# Patient Record
Sex: FEMALE | Race: BLACK OR AFRICAN AMERICAN | NOT HISPANIC OR LATINO | Employment: UNEMPLOYED | URBAN - METROPOLITAN AREA
[De-identification: names, ages, dates, MRNs, and addresses within clinical notes are randomized per-mention and may not be internally consistent; named-entity substitution may affect disease eponyms.]

---

## 2023-03-03 LAB
ALBUMIN (MG/L) IN URINE: NORMAL
ALBUMIN/CREATININE (UG/MG) IN URINE: NORMAL
ANION GAP IN SER/PLAS: 14 MMOL/L (ref 10–20)
CALCIUM (MG/DL) IN SER/PLAS: 9.9 MG/DL (ref 8.6–10.6)
CARBON DIOXIDE, TOTAL (MMOL/L) IN SER/PLAS: 25 MMOL/L (ref 21–32)
CHLORIDE (MMOL/L) IN SER/PLAS: 105 MMOL/L (ref 98–107)
CHOLESTEROL (MG/DL) IN SER/PLAS: 186 MG/DL (ref 0–199)
CHOLESTEROL IN HDL (MG/DL) IN SER/PLAS: 40.9 MG/DL
CHOLESTEROL/HDL RATIO: 4.5
CREATININE (MG/DL) IN SER/PLAS: 0.47 MG/DL (ref 0.5–1.05)
CREATININE (MG/DL) IN URINE: NORMAL
ESTIMATED AVERAGE GLUCOSE FOR HBA1C: 151 MG/DL
GFR FEMALE: >90 ML/MIN/1.73M2
GLUCOSE (MG/DL) IN SER/PLAS: 105 MG/DL (ref 74–99)
HEMOGLOBIN A1C/HEMOGLOBIN TOTAL IN BLOOD: 6.9 %
LDL: 98 MG/DL (ref 0–99)
NON HDL CHOLESTEROL: 145 MG/DL
POTASSIUM (MMOL/L) IN SER/PLAS: 3.9 MMOL/L (ref 3.5–5.3)
SODIUM (MMOL/L) IN SER/PLAS: 140 MMOL/L (ref 136–145)
TRIGLYCERIDE (MG/DL) IN SER/PLAS: 235 MG/DL (ref 0–149)
UREA NITROGEN (MG/DL) IN SER/PLAS: 14 MG/DL (ref 6–23)
VLDL: 47 MG/DL (ref 0–40)

## 2023-03-26 DIAGNOSIS — I10 ESSENTIAL (PRIMARY) HYPERTENSION: ICD-10-CM

## 2023-03-26 DIAGNOSIS — E78.5 HYPERLIPIDEMIA, UNSPECIFIED: ICD-10-CM

## 2023-03-27 RX ORDER — PRAVASTATIN SODIUM 40 MG/1
40 TABLET ORAL NIGHTLY
COMMUNITY

## 2023-03-27 RX ORDER — LOSARTAN POTASSIUM 100 MG/1
100 TABLET ORAL DAILY
COMMUNITY
End: 2023-03-27 | Stop reason: SDUPTHER

## 2023-03-28 RX ORDER — PRAVASTATIN SODIUM 40 MG/1
TABLET ORAL
Qty: 90 TABLET | Refills: 3 | Status: SHIPPED | OUTPATIENT
Start: 2023-03-28 | End: 2024-02-22 | Stop reason: WASHOUT

## 2023-03-28 RX ORDER — LOSARTAN POTASSIUM 100 MG/1
100 TABLET ORAL DAILY
Qty: 90 TABLET | Refills: 3 | Status: SHIPPED | OUTPATIENT
Start: 2023-03-28 | End: 2024-02-22 | Stop reason: SDUPTHER

## 2023-03-28 RX ORDER — LOSARTAN POTASSIUM 100 MG/1
TABLET ORAL
Qty: 90 TABLET | Refills: 3 | OUTPATIENT
Start: 2023-03-28

## 2023-05-19 ENCOUNTER — TELEPHONE (OUTPATIENT)
Dept: PRIMARY CARE | Facility: CLINIC | Age: 63
End: 2023-05-19

## 2023-05-19 NOTE — TELEPHONE ENCOUNTER
Patient called saying that at her last appointment, she saw Dr Mckeon and Dr Mckeon upped her dosage of Trulicity but patient was only given a 30 day supply. Patient would like a 90 day supply

## 2023-05-25 DIAGNOSIS — E11.9 TYPE 2 DIABETES MELLITUS WITHOUT COMPLICATION, WITHOUT LONG-TERM CURRENT USE OF INSULIN (MULTI): Primary | ICD-10-CM

## 2023-05-25 RX ORDER — PEN NEEDLE, DIABETIC 30 GX3/16"
1 NEEDLE, DISPOSABLE MISCELLANEOUS ONCE
Qty: 30 EACH | Refills: 2 | Status: SHIPPED | OUTPATIENT
Start: 2023-05-25 | End: 2023-05-25

## 2023-05-25 RX ORDER — CONTAINER,EMPTY
1 EACH MISCELLANEOUS AS NEEDED
Qty: 1 EACH | Refills: 0 | Status: SHIPPED | OUTPATIENT
Start: 2023-05-25 | End: 2024-02-22 | Stop reason: WASHOUT

## 2023-05-25 RX ORDER — ISOPROPYL ALCOHOL 70 ML/100ML
1 SWAB TOPICAL ONCE
Qty: 50 EACH | Refills: 1 | Status: SHIPPED | OUTPATIENT
Start: 2023-05-25 | End: 2023-05-25

## 2023-07-19 DIAGNOSIS — E11.9 TYPE 2 DIABETES MELLITUS WITHOUT COMPLICATIONS (MULTI): ICD-10-CM

## 2023-07-19 RX ORDER — METFORMIN HYDROCHLORIDE 1000 MG/1
TABLET ORAL
Qty: 180 TABLET | Refills: 3 | Status: SHIPPED | OUTPATIENT
Start: 2023-07-19 | End: 2024-02-22 | Stop reason: SINTOL

## 2024-01-24 ENCOUNTER — TELEPHONE (OUTPATIENT)
Dept: NEUROLOGY | Facility: CLINIC | Age: 64
End: 2024-01-24
Payer: COMMERCIAL

## 2024-01-25 RX ORDER — GLIPIZIDE 10 MG/1
10 TABLET, FILM COATED, EXTENDED RELEASE ORAL DAILY
Qty: 90 TABLET | Refills: 3 | OUTPATIENT
Start: 2024-01-25

## 2024-02-22 ENCOUNTER — LAB (OUTPATIENT)
Dept: LAB | Facility: LAB | Age: 64
End: 2024-02-22
Payer: COMMERCIAL

## 2024-02-22 ENCOUNTER — OFFICE VISIT (OUTPATIENT)
Dept: PRIMARY CARE | Facility: CLINIC | Age: 64
End: 2024-02-22
Payer: COMMERCIAL

## 2024-02-22 VITALS
DIASTOLIC BLOOD PRESSURE: 71 MMHG | SYSTOLIC BLOOD PRESSURE: 127 MMHG | WEIGHT: 231 LBS | BODY MASS INDEX: 36.26 KG/M2 | OXYGEN SATURATION: 98 % | TEMPERATURE: 96.5 F | HEIGHT: 67 IN | HEART RATE: 68 BPM

## 2024-02-22 DIAGNOSIS — I10 ESSENTIAL (PRIMARY) HYPERTENSION: ICD-10-CM

## 2024-02-22 DIAGNOSIS — E11.9 TYPE 2 DIABETES MELLITUS WITHOUT COMPLICATION, WITHOUT LONG-TERM CURRENT USE OF INSULIN (MULTI): ICD-10-CM

## 2024-02-22 DIAGNOSIS — E66.01 OBESITY, MORBID (MULTI): ICD-10-CM

## 2024-02-22 DIAGNOSIS — E13.9 DIABETES 1.5, MANAGED AS TYPE 2 (MULTI): ICD-10-CM

## 2024-02-22 DIAGNOSIS — I10 PRIMARY HYPERTENSION: ICD-10-CM

## 2024-02-22 DIAGNOSIS — Z12.31 ENCOUNTER FOR SCREENING MAMMOGRAM FOR MALIGNANT NEOPLASM OF BREAST: ICD-10-CM

## 2024-02-22 DIAGNOSIS — Z23 NEED FOR VACCINATION: ICD-10-CM

## 2024-02-22 DIAGNOSIS — M54.40 LOW BACK PAIN WITH SCIATICA, SCIATICA LATERALITY UNSPECIFIED, UNSPECIFIED BACK PAIN LATERALITY, UNSPECIFIED CHRONICITY: ICD-10-CM

## 2024-02-22 DIAGNOSIS — Z00.00 ROUTINE GENERAL MEDICAL EXAMINATION AT A HEALTH CARE FACILITY: Primary | ICD-10-CM

## 2024-02-22 LAB
ALBUMIN SERPL BCP-MCNC: 4.3 G/DL (ref 3.4–5)
ANION GAP SERPL CALC-SCNC: 17 MMOL/L (ref 10–20)
BUN SERPL-MCNC: 14 MG/DL (ref 6–23)
CALCIUM SERPL-MCNC: 10 MG/DL (ref 8.6–10.6)
CHLORIDE SERPL-SCNC: 106 MMOL/L (ref 98–107)
CO2 SERPL-SCNC: 21 MMOL/L (ref 21–32)
CREAT SERPL-MCNC: 0.64 MG/DL (ref 0.5–1.05)
CREAT UR-MCNC: 110.9 MG/DL (ref 20–320)
EGFRCR SERPLBLD CKD-EPI 2021: >90 ML/MIN/1.73M*2
EST. AVERAGE GLUCOSE BLD GHB EST-MCNC: 169 MG/DL
GLUCOSE SERPL-MCNC: 152 MG/DL (ref 74–99)
HBA1C MFR BLD: 7.5 %
MICROALBUMIN UR-MCNC: 15.1 MG/L
MICROALBUMIN/CREAT UR: 13.6 UG/MG CREAT
PHOSPHATE SERPL-MCNC: 3.8 MG/DL (ref 2.5–4.9)
POTASSIUM SERPL-SCNC: 4.3 MMOL/L (ref 3.5–5.3)
SODIUM SERPL-SCNC: 140 MMOL/L (ref 136–145)

## 2024-02-22 PROCEDURE — 83036 HEMOGLOBIN GLYCOSYLATED A1C: CPT

## 2024-02-22 PROCEDURE — 90677 PCV20 VACCINE IM: CPT

## 2024-02-22 PROCEDURE — 80069 RENAL FUNCTION PANEL: CPT

## 2024-02-22 PROCEDURE — 4010F ACE/ARB THERAPY RXD/TAKEN: CPT

## 2024-02-22 PROCEDURE — G0439 PPPS, SUBSEQ VISIT: HCPCS

## 2024-02-22 PROCEDURE — 82570 ASSAY OF URINE CREATININE: CPT

## 2024-02-22 PROCEDURE — G0009 ADMIN PNEUMOCOCCAL VACCINE: HCPCS

## 2024-02-22 PROCEDURE — 3078F DIAST BP <80 MM HG: CPT

## 2024-02-22 PROCEDURE — 3051F HG A1C>EQUAL 7.0%<8.0%: CPT

## 2024-02-22 PROCEDURE — 3061F NEG MICROALBUMINURIA REV: CPT

## 2024-02-22 PROCEDURE — 3074F SYST BP LT 130 MM HG: CPT

## 2024-02-22 PROCEDURE — 82043 UR ALBUMIN QUANTITATIVE: CPT

## 2024-02-22 PROCEDURE — 36415 COLL VENOUS BLD VENIPUNCTURE: CPT

## 2024-02-22 RX ORDER — CYCLOBENZAPRINE HCL 5 MG
TABLET ORAL
COMMUNITY
Start: 2023-03-03 | End: 2024-02-22 | Stop reason: SDUPTHER

## 2024-02-22 RX ORDER — BLOOD-GLUCOSE METER
KIT MISCELLANEOUS
COMMUNITY
End: 2024-02-22 | Stop reason: SDUPTHER

## 2024-02-22 RX ORDER — AMMONIUM LACTATE 12 G/100G
CREAM TOPICAL
COMMUNITY

## 2024-02-22 RX ORDER — IBUPROFEN 200 MG
CAPSULE ORAL
COMMUNITY
Start: 2015-04-06 | End: 2024-02-22 | Stop reason: SDUPTHER

## 2024-02-22 RX ORDER — DICLOFENAC SODIUM 10 MG/G
GEL TOPICAL
Qty: 450 G | Refills: 3 | Status: SHIPPED | OUTPATIENT
Start: 2024-02-22

## 2024-02-22 RX ORDER — DICLOFENAC SODIUM 10 MG/G
GEL TOPICAL
COMMUNITY
Start: 2023-06-04 | End: 2024-02-22 | Stop reason: SDUPTHER

## 2024-02-22 RX ORDER — DICYCLOMINE HYDROCHLORIDE 20 MG/1
TABLET ORAL 3 TIMES DAILY PRN
COMMUNITY
Start: 2022-06-02 | End: 2024-02-22 | Stop reason: ALTCHOICE

## 2024-02-22 RX ORDER — GLIPIZIDE 10 MG/1
10 TABLET, FILM COATED, EXTENDED RELEASE ORAL DAILY
COMMUNITY
End: 2024-02-22 | Stop reason: SDUPTHER

## 2024-02-22 RX ORDER — GLIPIZIDE 10 MG/1
10 TABLET, FILM COATED, EXTENDED RELEASE ORAL DAILY
Qty: 30 TABLET | Refills: 11 | Status: SHIPPED | OUTPATIENT
Start: 2024-02-22

## 2024-02-22 RX ORDER — MICONAZOLE NITRATE 2 %
CREAM (GRAM) TOPICAL
COMMUNITY
End: 2024-02-22 | Stop reason: ALTCHOICE

## 2024-02-22 RX ORDER — KETOCONAZOLE 20 MG/G
CREAM TOPICAL
COMMUNITY

## 2024-02-22 RX ORDER — CYCLOBENZAPRINE HCL 5 MG
TABLET ORAL
Qty: 30 TABLET | Refills: 3 | Status: SHIPPED | OUTPATIENT
Start: 2024-02-22

## 2024-02-22 RX ORDER — DM/P-EPHED/ACETAMINOPH/DOXYLAM 30-7.5/3
LIQUID (ML) ORAL
COMMUNITY
Start: 2021-08-23

## 2024-02-22 RX ORDER — BLOOD-GLUCOSE METER
KIT MISCELLANEOUS
Qty: 30 STRIP | Refills: 11 | Status: SHIPPED | OUTPATIENT
Start: 2024-02-22

## 2024-02-22 RX ORDER — ACETAMINOPHEN 500 MG
TABLET ORAL
COMMUNITY
Start: 2014-04-15 | End: 2024-02-22 | Stop reason: SDUPTHER

## 2024-02-22 RX ORDER — BLOOD-GLUCOSE METER
KIT MISCELLANEOUS
Qty: 1 EACH | Refills: 0 | Status: SHIPPED | OUTPATIENT
Start: 2024-02-22 | End: 2025-02-21

## 2024-02-22 RX ORDER — LANCETS 28 GAUGE
EACH MISCELLANEOUS
Qty: 100 EACH | Refills: 11 | Status: SHIPPED | OUTPATIENT
Start: 2024-02-22

## 2024-02-22 RX ORDER — AMLODIPINE BESYLATE 10 MG/1
10 TABLET ORAL DAILY
COMMUNITY
End: 2024-02-22 | Stop reason: SDUPTHER

## 2024-02-22 RX ORDER — UBIQUINOL 100 MG
CAPSULE ORAL
COMMUNITY
Start: 2024-02-12

## 2024-02-22 RX ORDER — METHYLPREDNISOLONE 32 MG/1
1 TABLET ORAL DAILY
COMMUNITY
Start: 2013-07-17 | End: 2024-02-22 | Stop reason: ALTCHOICE

## 2024-02-22 RX ORDER — UBIDECARENONE 30 MG
1 CAPSULE ORAL DAILY
COMMUNITY
Start: 2020-01-31

## 2024-02-22 RX ORDER — ASPIRIN 81 MG/1
TABLET ORAL
COMMUNITY
Start: 2019-01-22

## 2024-02-22 RX ORDER — IBUPROFEN 800 MG/1
800 TABLET ORAL EVERY 8 HOURS PRN
Qty: 30 TABLET | Refills: 11 | Status: SHIPPED | OUTPATIENT
Start: 2024-02-22

## 2024-02-22 RX ORDER — METFORMIN HYDROCHLORIDE 500 MG/1
1000 TABLET, EXTENDED RELEASE ORAL
Qty: 60 TABLET | Refills: 11 | Status: SHIPPED | OUTPATIENT
Start: 2024-02-22 | End: 2025-02-21

## 2024-02-22 RX ORDER — GLATIRAMER ACETATE 20 MG/ML
INJECTION, SOLUTION SUBCUTANEOUS
COMMUNITY
Start: 2015-11-17 | End: 2024-03-26 | Stop reason: SDUPTHER

## 2024-02-22 RX ORDER — IBUPROFEN 200 MG
1 CAPSULE ORAL
Qty: 30 STRIP | Refills: 11 | Status: SHIPPED | OUTPATIENT
Start: 2024-02-22

## 2024-02-22 RX ORDER — IBUPROFEN 800 MG/1
1 TABLET ORAL EVERY 8 HOURS PRN
COMMUNITY
Start: 2018-03-08 | End: 2024-02-22 | Stop reason: SDUPTHER

## 2024-02-22 RX ORDER — AMLODIPINE BESYLATE 10 MG/1
10 TABLET ORAL DAILY
Qty: 30 TABLET | Refills: 11 | Status: SHIPPED | OUTPATIENT
Start: 2024-02-22

## 2024-02-22 RX ORDER — ALBUTEROL SULFATE 90 UG/1
AEROSOL, METERED RESPIRATORY (INHALATION)
COMMUNITY
End: 2024-02-22 | Stop reason: SDUPTHER

## 2024-02-22 RX ORDER — ACETAMINOPHEN 500 MG
2000 TABLET ORAL DAILY
Qty: 30 CAPSULE | Refills: 11 | Status: SHIPPED | OUTPATIENT
Start: 2024-02-22

## 2024-02-22 RX ORDER — LANCETS 28 GAUGE
EACH MISCELLANEOUS
COMMUNITY
Start: 2024-02-12 | End: 2024-02-22 | Stop reason: SDUPTHER

## 2024-02-22 RX ORDER — ALBUTEROL SULFATE 90 UG/1
AEROSOL, METERED RESPIRATORY (INHALATION)
Qty: 18 G | Refills: 11 | Status: SHIPPED | OUTPATIENT
Start: 2024-02-22

## 2024-02-22 RX ORDER — DIAZEPAM 5 MG/1
TABLET ORAL
COMMUNITY
Start: 2022-08-26

## 2024-02-22 RX ORDER — LOSARTAN POTASSIUM 100 MG/1
100 TABLET ORAL DAILY
Qty: 90 TABLET | Refills: 11 | Status: SHIPPED | OUTPATIENT
Start: 2024-02-22 | End: 2027-02-06

## 2024-02-22 RX ORDER — CETIRIZINE HYDROCHLORIDE 10 MG/1
1 TABLET ORAL NIGHTLY
COMMUNITY
Start: 2021-05-04 | End: 2024-02-22 | Stop reason: ALTCHOICE

## 2024-02-22 ASSESSMENT — PAIN SCALES - GENERAL: PAINLEVEL: 0-NO PAIN

## 2024-02-22 NOTE — PATIENT INSTRUCTIONS
Thank you for coming in to see us today, Ms. Apryl Chan! It was a pleasure managing your health together.    Today in clinic, we discussed your diabetes and your foot pain.    For your foot pain I recommend insoles or HOKA shoes.     We changed your metformin to an extended release so that you do not have as much GI side effects.     If we ordered bloodwork today, you can get this done at ANY  location and the results will come to me directly. The [facility] here at [facility] are walk-in (no appointment needed); [facility]'s hours are M-F 6:30a-6p and Sat 8a-12p.    If you have any questions/concerns, you can always use [employer] to message me directly. Or if you prefer, you can call the office at [phone]; if you leave a message, the office staff should translate this to a message in my inbox.    I'm looking forward to seeing you back in clinic in 3 months to discuss your diabetes.    Best,  Dr. Desir

## 2024-02-22 NOTE — PROGRESS NOTES
Subjective   Reason for Visit: Apryl Chan is an 63 y.o. female here for a Medicare Wellness visit.     # DMII  - states that BG is 150-170   - doing well with current meds: dulaglutide 3 mg daily, glipizide 10 mg/daily, and metformin 100 mg daily  - endorses GI side-effects w/ metformin.     # increased BMI   - BMI of 36.18    Reviewed all medications by prescribing practitioner or clinical pharmacist (such as prescriptions, OTCs, herbal therapies and supplements) and documented in the medical record.    # Medicare Wellness Questionnaire  - Age: 63 y.o.  - Gender: female   - During the past 4 weeks, how much of the been bothered by emotional problems such as feeling anxious, depressed, irritable, sad, or downhearted and blue? Slightly  - During the past 4 weeks, history of physical and emotional health limited your social activities with family, friends, neighbors, or groups? Not at all  - During the past 4 weeks, how much bodily pain have you generally had? Mild pain  - During the past 4 weeks, was someone available to help you if you needed and wanted help?  (For example, if you felt very nervous, lonely, or blue; got sick and had to stay in bed; needed someone to talk to; needed help with daily chores; or needed help just taking care of yourself.) Yes, as much as I wanted  - During the past 4 weeks, what was the hardest physical activity you could do for at least 2 minutes? Moderate  - ADLS/IADLs:  --- Can you get to places out of walking distance without help?  (For example, can you travel alone on buses or taxis, or drive your own car?) Yes  --- Can you go shopping for groceries or clothes without someone's help? Yes  --- Can you prepare your own meals? Yes  --- Can you do your housework without help? Yes  --- Because of any health problems, do you need the help of another person with your personal care needs such as eating, bathing, dressing, or getting around the house? Yes  --- Can you handle your own  "money without help? Yes  - During the past 4 weeks, how would you rate your health in general?Fair  - How have things been going for you during the past 4 weeks? Very well; could hardly be better  - Are you having difficulties driving your car?no  - Do you always fasten your seatbelt when you are in a car? Yes, usually  - How often during the past 4 weeks have you been bothered by any of the following problems?  --- Falling or dizzy when standing up. Never  --- Sexual problems. Never  --- Trouble eating well. Never  --- Teeth or denture problems. Sometimes  --- Problems using the telephone. Never  --- Tiredness or fatigue. Always  - Have you fallen 2 or more times in the past year? No  - Are you afraid of falling? No  - Are you a smoker? Yes, and I might quit  - During the past 4 weeks, how many drinks of wine, beer, or other alcoholic beverages did you have? 1 drink or less per week  - Do you exercise for about 20 minutes, 3 or more days a week? Yes, some of the time  - Have you been given any information to help you with the following:  --- Hazards in your house that might hurt you? No  --- Keeping track of your medications? No  - How often do you have trouble taking medicines the way you have been told to take them? I always take them as prescribed  - How confident are you that you can control and manage most of your health problems? Very confident  - What is your race?  (Check all that apply) Black or -American    Patient Care Team:  Glenda Cullen MD as PCP - [facility] PCP     Review of Systems    Objective   Vitals:  /71 (BP Location: Left arm, Patient Position: Sitting)   Pulse 68   Temp 35.8 °C (96.5 °F) (Temporal)   Ht 1.702 m (5' 7\")   Wt 105 kg (231 lb)   SpO2 98%   BMI 36.18 kg/m²       Physical Exam    Assessment/Plan   Problem List Items Addressed This Visit    None    # DMII   :: pt has side effects with metformin   - Switch metformin 1000 mg to XR.  - c/w trulicity 3 mg/ " week and glipizide 10 mg     # HTN  :: amlodipine 10 mg and losartan 100 mg   - urine albumin and RFP obtained    # HM   - Prevnar 20 administered   - colonoscopy referral and mammogram referral provided.     Attending Supervision: discussed with attending physician eB Holley in 3 months, or earlier as needed.    Reviewed and approved by VEE GARCÍA on 2/22/24 at 10:28 AM.

## 2024-02-24 PROBLEM — Z23 NEED FOR VACCINATION: Status: ACTIVE | Noted: 2024-02-24

## 2024-02-24 PROBLEM — E66.01 OBESITY, MORBID (MULTI): Status: ACTIVE | Noted: 2024-02-24

## 2024-02-25 NOTE — PROGRESS NOTES
I saw and evaluated the patient. I personally obtained the key and critical portions of the history and physical exam or was physically present for key and critical portions performed by the resident/fellow. I reviewed the resident/fellow's documentation and discussed the patient with the resident/fellow. I agree with the resident/fellow's medical decision making as documented in the note.    Fredo June MD

## 2024-02-27 DIAGNOSIS — I10 HYPERTENSION, UNSPECIFIED TYPE: Primary | ICD-10-CM

## 2024-03-05 ENCOUNTER — LAB (OUTPATIENT)
Dept: LAB | Facility: LAB | Age: 64
End: 2024-03-05
Payer: COMMERCIAL

## 2024-03-05 DIAGNOSIS — I10 HYPERTENSION, UNSPECIFIED TYPE: ICD-10-CM

## 2024-03-05 LAB — CRP SERPL-MCNC: 0.44 MG/DL

## 2024-03-05 PROCEDURE — 86140 C-REACTIVE PROTEIN: CPT

## 2024-03-05 PROCEDURE — 36415 COLL VENOUS BLD VENIPUNCTURE: CPT

## 2024-03-21 DIAGNOSIS — E13.9 DIABETES 1.5, MANAGED AS TYPE 2 (MULTI): Primary | ICD-10-CM

## 2024-03-25 RX ORDER — DULAGLUTIDE 0.75 MG/.5ML
0.75 INJECTION, SOLUTION SUBCUTANEOUS
Qty: 2 ML | Refills: 11 | Status: SHIPPED | OUTPATIENT
Start: 2024-03-25

## 2024-03-26 DIAGNOSIS — G35 MULTIPLE SCLEROSIS (MULTI): Primary | ICD-10-CM

## 2024-03-27 RX ORDER — GLATIRAMER ACETATE 20 MG/ML
20 INJECTION, SOLUTION SUBCUTANEOUS DAILY
Qty: 30 ML | Refills: 0 | Status: SHIPPED | OUTPATIENT
Start: 2024-03-27 | End: 2024-04-26

## 2024-04-27 ENCOUNTER — HOSPITAL ENCOUNTER (EMERGENCY)
Facility: HOSPITAL | Age: 64
Discharge: HOME | End: 2024-04-27
Payer: COMMERCIAL

## 2024-04-27 VITALS
HEART RATE: 75 BPM | HEIGHT: 67 IN | TEMPERATURE: 95.9 F | RESPIRATION RATE: 18 BRPM | SYSTOLIC BLOOD PRESSURE: 183 MMHG | OXYGEN SATURATION: 97 % | DIASTOLIC BLOOD PRESSURE: 84 MMHG | BODY MASS INDEX: 36.1 KG/M2 | WEIGHT: 230 LBS

## 2024-04-27 DIAGNOSIS — H00.011 HORDEOLUM EXTERNUM OF RIGHT UPPER EYELID: Primary | ICD-10-CM

## 2024-04-27 PROCEDURE — 99283 EMERGENCY DEPT VISIT LOW MDM: CPT | Performed by: NURSE PRACTITIONER

## 2024-04-27 PROCEDURE — 99283 EMERGENCY DEPT VISIT LOW MDM: CPT

## 2024-04-27 RX ORDER — ERYTHROMYCIN 5 MG/G
OINTMENT OPHTHALMIC EVERY 6 HOURS
Qty: 1 G | Refills: 0 | Status: SHIPPED | OUTPATIENT
Start: 2024-04-27 | End: 2024-05-02

## 2024-04-27 ASSESSMENT — PAIN - FUNCTIONAL ASSESSMENT: PAIN_FUNCTIONAL_ASSESSMENT: 0-10

## 2024-04-27 ASSESSMENT — COLUMBIA-SUICIDE SEVERITY RATING SCALE - C-SSRS
2. HAVE YOU ACTUALLY HAD ANY THOUGHTS OF KILLING YOURSELF?: NO
1. IN THE PAST MONTH, HAVE YOU WISHED YOU WERE DEAD OR WISHED YOU COULD GO TO SLEEP AND NOT WAKE UP?: NO
6. HAVE YOU EVER DONE ANYTHING, STARTED TO DO ANYTHING, OR PREPARED TO DO ANYTHING TO END YOUR LIFE?: NO

## 2024-04-27 ASSESSMENT — PAIN SCALES - GENERAL: PAINLEVEL_OUTOF10: 10 - WORST POSSIBLE PAIN

## 2024-04-27 ASSESSMENT — PAIN DESCRIPTION - LOCATION: LOCATION: EYE

## 2024-04-27 ASSESSMENT — PAIN DESCRIPTION - ORIENTATION: ORIENTATION: RIGHT

## 2024-04-27 NOTE — ED PROVIDER NOTES
HPI   Chief Complaint   Patient presents with   • Eye Problem       A very pleasant 63-year-old female with a history significant for diabetes, hypertension, dyslipidemia, and multiple sclerosis presents ambulatory to the emergency department with a 3-day history of tenderness, swelling, and discharge to her left medial upper eyelid.  Denies any associated fever, chills, headache, visual changes,rhinorrhea, sinus pain or pressure, vertigo, ear pain, focal numbness/tingling/weakness, eye trauma, foreign body sensation, limited range of motion of the eye, photophobia,pruritus, sore throat, chest pain, shortness of breath, abdominal pain, nausea, vomiting, diarrhea.  States that her blood sugar was 160 yesterday.    Review of systems: A review of systems was performed with pertinent positives and negatives as per HPI    Family history: Noncontributory    Social history: Smokes 2 cigarettes daily.  Drinks alcohol occasionally.  Denies any drug use.    Medical history: Diabetes, hypertension, dyslipidemia, multiple sclerosis    Surgical history: Denies any surgical procedures    Medications: Trulicity, metformin, amlodipine, losartan, glaucoma, vitamin D, pravastatin    Allergies: Lisinopril, Ultram    Physical exam:  Gen.: Awake, alert, oriented. No acute distress afebrile. Normal phonation, no stridor, no trismus.   Optho: RADHA EOMI No scleral icterus. Exam of the left eye is unremarkable. Exam of the right eye without conjunctival injection. There are no obvious foreign bodies. Upper lid with positive medial swelling/erythema/tenderness with small amount of crusted yellow discharge. There is no direct or consensual photophobia. No afferent pupillary defect.   ENT: EACs unremarkable. Mastoids nontender. Posterior oropharynx without erythema, exudate, or swelling. Uvula is in the midline and nonedematous.   Neck: Supple. No meningismus through full range of motion. No lymphadenopathy.   Cardiac: Regular rate rhythm no  murmur. No reproducible tenderness.   Respiratory: No increases work of breathing. Lung sounds CTA.   Abdomen: Soft nontender with normoactive bowel sounds.                               [place] Coma Scale Score: 15                     Patient History   Past Medical History:   Diagnosis Date   • Age-related nuclear cataract, left eye 08/27/2015    Age-related nuclear cataract of left eye   • Age-related nuclear cataract, right eye 01/31/2017    Age-related nuclear cataract of right eye   • Chronic obstructive pulmonary disease, unspecified (Multi) 04/13/2015    COPD (chronic obstructive pulmonary disease)   • Hypertension    • Multiple sclerosis (Multi) 07/15/2015    History of multiple sclerosis   • Multiple sclerosis (Multi) 11/02/2022    Multiple sclerosis   • Other conditions influencing health status 06/04/2021    Background diabetic retinopathy of left eye without macular edema determined by association associated with type 2 diabetes mellitus   • Personal history of other diseases of the circulatory system 07/15/2015    History of hypertension   • Personal history of other diseases of the respiratory system     History of chronic obstructive lung disease   • Personal history of other endocrine, nutritional and metabolic disease     History of insulin dependent diabetes mellitus   • Type 2 diabetes mellitus without complications (Multi) 02/23/2022    Diabetes mellitus type 2, controlled   • Unspecified blepharitis left eye, unspecified eyelid 01/31/2017    Blepharitis of left eye     History reviewed. No pertinent surgical history.  No family history on file.  Social History     Tobacco Use   • Smoking status: Every Day     Types: Cigarettes   • Smokeless tobacco: Current   Substance Use Topics   • Alcohol use: Not Currently   • Drug use: Never       Physical Exam   ED Triage Vitals [04/27/24 1132]   Temperature Heart Rate Respirations BP   35.5 °C (95.9 °F) 75 18 (!) 183/84      Pulse Ox Temp src Heart Rate  Source Patient Position   97 % -- -- --      BP Location FiO2 (%)     -- --       Physical Exam    ED Course & MDM   Diagnoses as of 04/27/24 1348   Hordeolum externum of right upper eyelid       Medical Decision Making  A physical examination and interview was conducted with the patient who presents ambulatory to the emergency department for right eyelid redness, swelling, and discharge.  Patient is sitting up comfortably in a chair.  In no acute distress.  She has a hordeolum of her right upper eyelid.  No visual changes.  I discussed plan of care and differential with the patient.  I will prescribe her erythromycin eye ointment and refer her to follow-up with ophthalmology this week.  I reviewed general care for a stye including applying warm compresses, gentle massage, and to not squeeze or try to pop the area.  Advised to return to the emergency department as needed for worsening symptoms or other concerns.        Procedure  Procedures     ANUJ Hewitt-CHRISTIANO  04/27/24 5539

## 2024-05-03 DIAGNOSIS — I10 HYPERTENSION, UNSPECIFIED TYPE: Primary | ICD-10-CM

## 2024-05-06 ENCOUNTER — LAB (OUTPATIENT)
Dept: LAB | Facility: LAB | Age: 64
End: 2024-05-06
Payer: COMMERCIAL

## 2024-05-06 DIAGNOSIS — I10 HYPERTENSION, UNSPECIFIED TYPE: ICD-10-CM

## 2024-05-06 PROCEDURE — 86140 C-REACTIVE PROTEIN: CPT

## 2024-05-07 LAB — CRP SERPL-MCNC: 1.17 MG/DL

## 2024-05-09 ENCOUNTER — APPOINTMENT (OUTPATIENT)
Dept: PRIMARY CARE | Facility: CLINIC | Age: 64
End: 2024-05-09
Payer: COMMERCIAL

## 2024-05-14 ENCOUNTER — APPOINTMENT (OUTPATIENT)
Dept: PRIMARY CARE | Facility: CLINIC | Age: 64
End: 2024-05-14
Payer: COMMERCIAL

## 2024-06-11 DIAGNOSIS — Z12.11 COLON CANCER SCREENING: Primary | ICD-10-CM

## 2024-06-11 RX ORDER — POLYETHYLENE GLYCOL 3350, SODIUM CHLORIDE, SODIUM BICARBONATE, POTASSIUM CHLORIDE 420; 11.2; 5.72; 1.48 G/4L; G/4L; G/4L; G/4L
4000 POWDER, FOR SOLUTION ORAL ONCE
Qty: 4000 ML | Refills: 0 | Status: SHIPPED | OUTPATIENT
Start: 2024-06-11 | End: 2024-06-11

## 2024-06-11 NOTE — PROGRESS NOTES
Bowel preparation has been prescribed for patient's upcoming colonoscopy procedure.    Martin Canela MD  Gastroenterology

## 2024-06-21 ENCOUNTER — APPOINTMENT (OUTPATIENT)
Dept: GASTROENTEROLOGY | Facility: HOSPITAL | Age: 64
End: 2024-06-21
Payer: COMMERCIAL

## 2024-07-11 ENCOUNTER — SPECIALTY PHARMACY (OUTPATIENT)
Dept: PHARMACY | Facility: CLINIC | Age: 64
End: 2024-07-11

## 2024-07-11 DIAGNOSIS — G35 MULTIPLE SCLEROSIS (MULTI): ICD-10-CM

## 2024-07-11 RX ORDER — GLATIRAMER ACETATE 20 MG/ML
INJECTION, SOLUTION SUBCUTANEOUS
Qty: 30 ML | Refills: 0 | Status: SHIPPED | OUTPATIENT
Start: 2024-07-11

## 2024-07-19 ENCOUNTER — APPOINTMENT (OUTPATIENT)
Dept: PRIMARY CARE | Facility: CLINIC | Age: 64
End: 2024-07-19
Payer: COMMERCIAL

## 2024-07-19 VITALS
HEART RATE: 71 BPM | TEMPERATURE: 96.7 F | RESPIRATION RATE: 18 BRPM | BODY MASS INDEX: 36.22 KG/M2 | SYSTOLIC BLOOD PRESSURE: 129 MMHG | HEIGHT: 67 IN | WEIGHT: 230.8 LBS | OXYGEN SATURATION: 98 % | DIASTOLIC BLOOD PRESSURE: 85 MMHG

## 2024-07-19 DIAGNOSIS — Z71.6 ENCOUNTER FOR TOBACCO USE CESSATION COUNSELING: ICD-10-CM

## 2024-07-19 DIAGNOSIS — E13.9 DIABETES 1.5, MANAGED AS TYPE 2 (MULTI): Primary | ICD-10-CM

## 2024-07-19 DIAGNOSIS — E11.9 TYPE 2 DIABETES MELLITUS WITHOUT COMPLICATION, WITHOUT LONG-TERM CURRENT USE OF INSULIN (MULTI): ICD-10-CM

## 2024-07-19 DIAGNOSIS — E78.2 MIXED HYPERLIPIDEMIA: ICD-10-CM

## 2024-07-19 RX ORDER — METFORMIN HYDROCHLORIDE 500 MG/1
1000 TABLET ORAL
Qty: 400 TABLET | Refills: 3 | Status: SHIPPED | OUTPATIENT
Start: 2024-07-19 | End: 2025-08-23

## 2024-07-19 RX ORDER — NICOTINE 7MG/24HR
1 PATCH, TRANSDERMAL 24 HOURS TRANSDERMAL EVERY 24 HOURS
Qty: 14 PATCH | Refills: 2 | Status: SHIPPED | OUTPATIENT
Start: 2024-07-19 | End: 2024-08-30

## 2024-07-19 RX ORDER — PRAVASTATIN SODIUM 40 MG/1
40 TABLET ORAL NIGHTLY
Qty: 90 TABLET | Refills: 3 | Status: SHIPPED | OUTPATIENT
Start: 2024-07-19

## 2024-07-19 ASSESSMENT — PATIENT HEALTH QUESTIONNAIRE - PHQ9
2. FEELING DOWN, DEPRESSED OR HOPELESS: NOT AT ALL
1. LITTLE INTEREST OR PLEASURE IN DOING THINGS: NOT AT ALL
SUM OF ALL RESPONSES TO PHQ9 QUESTIONS 1 AND 2: 0

## 2024-07-19 ASSESSMENT — ENCOUNTER SYMPTOMS
DEPRESSION: 0
LOSS OF SENSATION IN FEET: 0
OCCASIONAL FEELINGS OF UNSTEADINESS: 0

## 2024-07-19 ASSESSMENT — COLUMBIA-SUICIDE SEVERITY RATING SCALE - C-SSRS
6. HAVE YOU EVER DONE ANYTHING, STARTED TO DO ANYTHING, OR PREPARED TO DO ANYTHING TO END YOUR LIFE?: NO
1. IN THE PAST MONTH, HAVE YOU WISHED YOU WERE DEAD OR WISHED YOU COULD GO TO SLEEP AND NOT WAKE UP?: NO
2. HAVE YOU ACTUALLY HAD ANY THOUGHTS OF KILLING YOURSELF?: NO

## 2024-07-19 ASSESSMENT — PAIN SCALES - GENERAL: PAINLEVEL: 0-NO PAIN

## 2024-07-19 NOTE — PROGRESS NOTES
"  Subjective   Patient ID: Apryl Chan is a 64 y.o. female who presents for primary care medicare annual (Follow up) and Med Refill.       1. DM2, non-insulin dependent  Last trulicyt injection was Sunday  Will resume the 3.0 weekly this cming Sunday  Metformin XR 4-5 days of left flakn pain  Switched back to regular Metf    A/  A1c 7.5% 2/2024    P/  [ ] patient going to  proper dose of trulicity (3 mg); has been using 0.75 in the interrim due to lack of stock at pharmacy  [ ] time-stamped A1c for 1 month, following consitent admin of correct GLP-1 RA, as above    2. HLD  Overall, has some adherence issues with all medications  Working on pill box and timers in her phone  No side effects to statin therapy    A/P/  BMI 36.15  SCVD risk sore 34.1%  [ ] renewed statin  [ ] counseling based around possible claudication    3. Nicotine cessation counseling  Patient motivated to quit  Extra motivation if it is affecting her leg pain  Daily consumption of max 3 cigarettes, usually 1    A/P/  [ ] sent nicotine patches, 7 mg    Med Refill             Review of Systems   All other systems reviewed and are negative.      Objective   /85 (BP Location: Right arm, Patient Position: Sitting, BP Cuff Size: Adult)   Pulse 71   Temp 35.9 °C (96.7 °F) (Temporal)   Resp 18   Ht 1.702 m (5' 7\")   Wt 105 kg (230 lb 12.8 oz)   SpO2 98%   BMI 36.15 kg/m²     Physical Exam  Vitals reviewed.   Constitutional:       Appearance: Normal appearance.      Comments: BMI 36, class II obesity   HENT:      Head: Normocephalic and atraumatic.      Mouth/Throat:      Mouth: Mucous membranes are moist.      Pharynx: Oropharynx is clear.   Eyes:      Extraocular Movements: Extraocular movements intact.      Conjunctiva/sclera: Conjunctivae normal.      Pupils: Pupils are equal, round, and reactive to light.   Cardiovascular:      Rate and Rhythm: Normal rate and regular rhythm.      Pulses: Normal pulses.      Heart sounds: " Normal heart sounds.   Pulmonary:      Effort: Pulmonary effort is normal.      Breath sounds: Normal breath sounds.   Abdominal:      General: Abdomen is flat. Bowel sounds are normal.      Palpations: Abdomen is soft.   Musculoskeletal:         General: Normal range of motion.      Cervical back: Normal range of motion and neck supple.   Skin:     General: Skin is warm and dry.      Capillary Refill: Capillary refill takes less than 2 seconds.   Neurological:      General: No focal deficit present.      Mental Status: She is alert.   Psychiatric:         Mood and Affect: Mood normal.         Behavior: Behavior normal.         Assessment/Plan   Problem List Items Addressed This Visit    None  Visit Diagnoses         Codes    Diabetes 1.5, managed as type 2 (Multi)    -  Primary E13.9    Type 2 diabetes mellitus without complication, without long-term current use of insulin (Multi)     E11.9    Relevant Medications    metFORMIN (Glucophage) 500 mg tablet    Other Relevant Orders    Hemoglobin A1c    Encounter for tobacco use cessation counseling     Z71.6    Relevant Medications    nicotine (Nicoderm CQ) 7 mg/24 hr patch    Mixed hyperlipidemia     E78.2    Relevant Medications    pravastatin (Pravachol) 40 mg tablet                       OVERALL PLAN:  [ ] time stamped A1c  [ ] sent nicotine patches, 7 mg  [ ] counseling based around possible claudication        Note: This documentaion was entered into the electronic medical record using [employer] medical dictation software, and was not necessarily edited thereafter. Please excuse any errors of spelling or grammar.

## 2024-07-22 PROBLEM — J44.9 COPD (CHRONIC OBSTRUCTIVE PULMONARY DISEASE) (MULTI): Status: ACTIVE | Noted: 2024-07-22

## 2024-08-01 ENCOUNTER — LAB (OUTPATIENT)
Dept: LAB | Facility: LAB | Age: 64
End: 2024-08-01
Payer: COMMERCIAL

## 2024-08-01 DIAGNOSIS — I10 HYPERTENSION, UNSPECIFIED TYPE: Primary | ICD-10-CM

## 2024-08-01 DIAGNOSIS — I10 HYPERTENSION, UNSPECIFIED TYPE: ICD-10-CM

## 2024-08-01 LAB — CRP SERPL-MCNC: 0.5 MG/DL

## 2024-08-01 PROCEDURE — 86140 C-REACTIVE PROTEIN: CPT

## 2024-08-16 DIAGNOSIS — G35 MULTIPLE SCLEROSIS (MULTI): ICD-10-CM

## 2024-08-16 RX ORDER — GLATIRAMER ACETATE 20 MG/ML
INJECTION, SOLUTION SUBCUTANEOUS
Qty: 30 ML | Refills: 0 | Status: SHIPPED | OUTPATIENT
Start: 2024-08-16

## 2024-08-19 ENCOUNTER — LAB (OUTPATIENT)
Dept: LAB | Facility: LAB | Age: 64
End: 2024-08-19
Payer: COMMERCIAL

## 2024-08-19 DIAGNOSIS — I10 HYPERTENSION, UNSPECIFIED TYPE: ICD-10-CM

## 2024-08-19 DIAGNOSIS — E11.9 TYPE 2 DIABETES MELLITUS WITHOUT COMPLICATION, WITHOUT LONG-TERM CURRENT USE OF INSULIN (MULTI): ICD-10-CM

## 2024-08-19 DIAGNOSIS — I10 HYPERTENSION, UNSPECIFIED TYPE: Primary | ICD-10-CM

## 2024-08-19 LAB
CHOLEST SERPL-MCNC: 219 MG/DL (ref 0–199)
CHOLESTEROL/HDL RATIO: 5
EST. AVERAGE GLUCOSE BLD GHB EST-MCNC: 197 MG/DL
HBA1C MFR BLD: 8.5 %
HDLC SERPL-MCNC: 43.9 MG/DL
NON-HDL CHOLESTEROL: 175 MG/DL (ref 0–149)

## 2024-08-21 ENCOUNTER — APPOINTMENT (OUTPATIENT)
Dept: OPHTHALMOLOGY | Facility: CLINIC | Age: 64
End: 2024-08-21
Payer: COMMERCIAL

## 2024-09-18 ENCOUNTER — HOSPITAL ENCOUNTER (EMERGENCY)
Facility: HOSPITAL | Age: 64
Discharge: HOME | End: 2024-09-18
Payer: COMMERCIAL

## 2024-09-18 VITALS
DIASTOLIC BLOOD PRESSURE: 81 MMHG | TEMPERATURE: 96.8 F | SYSTOLIC BLOOD PRESSURE: 186 MMHG | BODY MASS INDEX: 36.1 KG/M2 | WEIGHT: 230 LBS | RESPIRATION RATE: 18 BRPM | OXYGEN SATURATION: 100 % | HEART RATE: 67 BPM | HEIGHT: 67 IN

## 2024-09-18 DIAGNOSIS — M54.40 LOW BACK PAIN WITH SCIATICA, SCIATICA LATERALITY UNSPECIFIED, UNSPECIFIED BACK PAIN LATERALITY, UNSPECIFIED CHRONICITY: ICD-10-CM

## 2024-09-18 DIAGNOSIS — M79.18 MUSCULOSKELETAL PAIN: Primary | ICD-10-CM

## 2024-09-18 PROCEDURE — 2500000004 HC RX 250 GENERAL PHARMACY W/ HCPCS (ALT 636 FOR OP/ED): Performed by: NURSE PRACTITIONER

## 2024-09-18 PROCEDURE — 99284 EMERGENCY DEPT VISIT MOD MDM: CPT | Performed by: NURSE PRACTITIONER

## 2024-09-18 PROCEDURE — 96372 THER/PROPH/DIAG INJ SC/IM: CPT | Performed by: NURSE PRACTITIONER

## 2024-09-18 PROCEDURE — 99283 EMERGENCY DEPT VISIT LOW MDM: CPT

## 2024-09-18 RX ORDER — ORPHENADRINE CITRATE 30 MG/ML
60 INJECTION INTRAMUSCULAR; INTRAVENOUS ONCE
Status: COMPLETED | OUTPATIENT
Start: 2024-09-18 | End: 2024-09-18

## 2024-09-18 RX ORDER — NAPROXEN 500 MG/1
500 TABLET ORAL 2 TIMES DAILY PRN
Qty: 30 TABLET | Refills: 0 | Status: SHIPPED | OUTPATIENT
Start: 2024-09-18 | End: 2024-10-03

## 2024-09-18 RX ORDER — CYCLOBENZAPRINE HCL 5 MG
TABLET ORAL
Qty: 30 TABLET | Refills: 0 | Status: SHIPPED | OUTPATIENT
Start: 2024-09-18

## 2024-09-18 RX ORDER — KETOROLAC TROMETHAMINE 30 MG/ML
30 INJECTION, SOLUTION INTRAMUSCULAR; INTRAVENOUS ONCE
Status: COMPLETED | OUTPATIENT
Start: 2024-09-18 | End: 2024-09-18

## 2024-09-18 ASSESSMENT — COLUMBIA-SUICIDE SEVERITY RATING SCALE - C-SSRS
2. HAVE YOU ACTUALLY HAD ANY THOUGHTS OF KILLING YOURSELF?: NO
6. HAVE YOU EVER DONE ANYTHING, STARTED TO DO ANYTHING, OR PREPARED TO DO ANYTHING TO END YOUR LIFE?: NO
1. IN THE PAST MONTH, HAVE YOU WISHED YOU WERE DEAD OR WISHED YOU COULD GO TO SLEEP AND NOT WAKE UP?: NO

## 2024-09-18 ASSESSMENT — PAIN - FUNCTIONAL ASSESSMENT: PAIN_FUNCTIONAL_ASSESSMENT: 0-10

## 2024-09-18 ASSESSMENT — PAIN SCALES - GENERAL: PAINLEVEL_OUTOF10: 9

## 2024-09-18 NOTE — ED PROVIDER NOTES
Emergency Department Encounter  [facility] EMERGENCY MEDICINE    Patient: Apryl Chan  MRN: [id number]  : 1960  Date of Evaluation: 2024  ED Provider: MARCI Sutton      Chief Complaint       Chief Complaint   Patient presents with    Arm Injury    Leg Injury     [place]    (Location/Symptom, Timing/Onset, Context/Setting, Quality, Duration, Modifying Factors, Severity) Note limiting factors.   Limitations to History: none  Historian: self  Records reviewed: EMR inpatient and outpatient notes, Care Everywhere      Apryl Chan is a 64 y.o. female with past medical history of MS, hypertension, diabetes who presents to the emergency department complaining of left-sided pain.  Around 415 this evening she was exiting her apartment building in the elevator close in her left side.  She admits to losing her balance but did not fall.  She describes the pain starting at the left side of her lateral neck going towards her left lateral hip.  She rates the pain 8 out of 10 describing it as throbbing/stabbing/burning that comes and goes.  She admits to neck pain when turning her head to the left side.  Denies nausea, vomiting, shortness of breath, fever, chills, recent illnesses.  Patient is currently taking losartan, metformin, amlodipine, pravastatin, glatpoa.  Allergies to lisinopril and tramadol.  Patient states she drinks 3 beers couple times a week.  She also smokes marijuana and a few cigarettes a day.  Denies any other illicit drug use    ROS:     Review of Systems  14 systems reviewed and otherwise acutely negative except as in the [place].          Past History     Past Medical History:   Diagnosis Date    Age-related nuclear cataract, left eye 2015    Age-related nuclear cataract of left eye    Age-related nuclear cataract, right eye 2017    Age-related nuclear cataract of right eye    Chronic obstructive pulmonary disease, unspecified (Multi) 2015    COPD  (chronic obstructive pulmonary disease)    Hypertension     Multiple sclerosis (Multi) 07/15/2015    History of multiple sclerosis    Multiple sclerosis (Multi) 11/02/2022    Multiple sclerosis    Other conditions influencing health status 06/04/2021    Background diabetic retinopathy of left eye without macular edema determined by association associated with type 2 diabetes mellitus    Personal history of other diseases of the circulatory system 07/15/2015    History of hypertension    Personal history of other diseases of the respiratory system     History of chronic obstructive lung disease    Personal history of other endocrine, nutritional and metabolic disease     History of insulin dependent diabetes mellitus    Type 2 diabetes mellitus without complications (Multi) 02/23/2022    Diabetes mellitus type 2, controlled    Unspecified blepharitis left eye, unspecified eyelid 01/31/2017    Blepharitis of left eye     No past surgical history on file.  Social History     Socioeconomic History    Marital status: Single   Tobacco Use    Smoking status: Every Day     Types: Cigarettes    Smokeless tobacco: Current   Substance and Sexual Activity    Alcohol use: Not Currently    Drug use: Never     Social Determinants of Health     Financial Resource Strain: Not on File (10/19/2023)    Received from [facility]    Financial Resource Strain     Financial Resource Strain: 0   Transportation Needs: Not on File (10/19/2023)    Received from [facility]    Transportation Needs     Transportation: 0   Physical Activity: Not on File (10/19/2023)    Received from [facility]    Physical Activity     Physical Activity: 0   Stress: Not on File (10/19/2023)    Received from [facility]    Stress     Stress: 0   Social Connections: Not on File (10/19/2023)    Received from [facility]    Social Connections     Social Connections and Isolation: 0   Housing Stability: Not on File (10/19/2023)    Received from [facility]     Housing Stability     [place]       Medications/Allergies     Previous Medications    ALBUTEROL 90 MCG/ACTUATION INHALER    INHALE 1 TO 2 PUFFS BY MOUTH EVERY 4 TO 6 HOURS AS NEEDED AS DIRECTED    ALCOHOL PREP PADS PADS, MEDICATED    APPLY 1 SWAB TOPICALLY 1 TIME FOR 1 DOSE.    AMLODIPINE (NORVASC) 10 MG TABLET    Take 1 tablet (10 mg) by mouth once daily.    AMMONIUM LACTATE (AMLACTIN) 12 % CREAM    APPLY 1 APPLICATION TOPICALLY EVERY DAY    ASPIRIN 81 MG EC TABLET    Take by mouth.    BLOOD SUGAR DIAGNOSTIC (BLOOD GLUCOSE TEST) STRIP    Inject 1 strip under the skin once daily.    CHOLECALCIFEROL (VITAMIN D-3) 50 MCG (2,000 UNIT) CAPSULE    Take 1 capsule (50 mcg) by mouth early in the morning..    DIAZEPAM (VALIUM) 5 MG TABLET    Take by mouth.    DICLOFENAC SODIUM (VOLTAREN) 1 % GEL    APPLY SPARINGLY TO AFFECTED AREA EVERY DAY    DULAGLUTIDE (TRULICITY) 0.75 MG/0.5 ML PEN INJECTOR    INJECT 0.5 ML SUBCUTANEOUSLY WEEKLY    DULAGLUTIDE 3 MG/0.5 ML PEN INJECTOR    Inject 3 mg under the skin 1 (one) time per week.    FREESTYLE LANCETS 28 GAUGE    TEST BLOOD GLUCOSE EVERY MORNING BEFORE EATING    FREESTYLE LITE METER KIT    Take reading twice/ day    FREESTYLE LITE STRIPS STRIP    TEST ONCE DAILY    GLATOPA 20 MG/ML SYRINGE    ADMINISTER 20MG(1 INJECTION) UNDER THE SKIN ONCE DAILY FOR 30 DAYS    GLIPIZIDE XL (GLUCOTROL XL) 10 MG 24 HR TABLET    Take 1 tablet (10 mg) by mouth once daily. as directed    IBUPROFEN 800 MG TABLET    Take 1 tablet (800 mg) by mouth every 8 hours if needed for moderate pain (4 - 6).    KETOCONAZOLE (NIZORAL) 2 % CREAM    APPLY TOPICALLY EVERY DAY    LOSARTAN (COZAAR) 100 MG TABLET    Take 1 tablet (100 mg) by mouth once daily.    METFORMIN (GLUCOPHAGE) 500 MG TABLET    Take 2 tablets (1,000 mg) by mouth 2 times daily (morning and late afternoon).    MV-MIN-FOLIC ACID-LUTEIN (ESSENTIAL WOMAN 50 PLUS) 0.4-250 MG-MCG TABLET    Take 1 tablet by mouth once daily.    NICOTINE (NICODERM CQ) 7  MG/24 HR PATCH    Place 1 patch over 24 hours on the skin once every 24 hours.    PRAVASTATIN (PRAVACHOL) 40 MG TABLET    Take 1 tablet (40 mg) by mouth once daily at bedtime.     Allergies   Allergen Reactions    Lisinopril Swelling and Unknown    Tramadol Unknown     N&V        Physical Exam       ED Triage Vitals [09/18/24 1712]   Temperature Heart Rate Respirations BP   36 °C (96.8 °F) 67 18 (!) 186/81      Pulse Ox Temp Source Heart Rate Source Patient Position   100 % Temporal -- --      BP Location FiO2 (%)     -- --         Physical Exam    GENERAL:  The patient appears nourished and normally developed. Vital signs as documented.     HEENT:  Head normocephalic, atraumatic, EOMs intact, PERRLA, Mucous membranes moist. Nares patent without copious rhinorrhea.  No lymphadenopathy.    PULMONARY:  Lungs are clear to auscultation, without any respiratory distress. Able to speak full sentences, no accessory muscle use    CARDIAC:   Normal rate. No murmurs, rubs or gallops    ABDOMEN:  Soft, non distended, non tender, BS positive x 4 quadrants, No rebound or guarding, no peritoneal signs, no CVA tenderness, no masses or organomegaly      MUSCULOSKELETAL:   Able to ambulate, Non edematous, with no obvious deformities. Pulses intact distal. No midline Cervical tenderness. Tender over left arm and left lateral abdominal side.  No midline spinal tenderness, deformities, step-offs.    SKIN:   Good color, with no significant rashes.  No pallor.    NEURO:  No obvious neurological deficits, normal sensation and strength bilaterally.  Able to follow commands, NIH 0, CN 2-12 intact.        Diagnostics   Labs:  Labs Reviewed - No data to display  Radiographs:  No orders to display             Assessment   In brief, Apryl Chan is a 64 y.o. female who presented to the emergency department with left sided pain starting at 4:15 PM today after an elevator closed on her.    Plan   Given toradol and discharged with pain  "meds.  Differentials   Strain  Sprain  Fracture  Contusion      ED Course     Diagnoses as of 09/18/24 1838   Musculoskeletal pain       Visit Vitals  BP (!) 186/81   Pulse 67   Temp 36 °C (96.8 °F) (Temporal)   Resp 18   Ht 1.702 m (5' 7\")   Wt 104 kg (230 lb)   SpO2 100%   BMI 36.02 kg/m²   Smoking Status Every Day   BSA 2.22 m²       Medications   ketorolac (Toradol) injection 30 mg (has no administration in time range)   orphenadrine (Norflex) injection 60 mg (has no administration in time range)       Plan of care discussed, Apryl Chan will be given toradol and norflex to relieve her pain.  She is able to ambulate with no obvious deformities.  She is tender over her left lateral cervical region.  As well as her left arm and left lateral abdominal side.  She is able to have active and passive range of motion on both sides.  Neurologically intact and strength on both sides 5/5.  Patient without any obvious deformities do not feel there is any fracture, mechanism was low impact and due to an elevator door closing on her.  Patient will be discharged in stable condition, given a prescription for NSAIDs, muscle relaxants, educated on any worsening signs and symptoms to return to the emergency department        Final Impression      1. Musculoskeletal pain    2. Low back pain with sciatica, sciatica laterality unspecified, unspecified back pain laterality, unspecified chronicity          DISPOSITION  Disposition: Discharge  Patient condition is: Stable    Comment: Please note this report has been produced using speech recognition software and may contain errors related to that system including errors in grammar, punctuation, and spelling, as well as words and phrases that may be inappropriate.  If there are any questions or concerns please feel free to contact the dictating provider for clarification.    MARCI Sutton APRN-CNP  09/18/24 1840    "

## 2024-09-27 DIAGNOSIS — G35 MULTIPLE SCLEROSIS (MULTI): ICD-10-CM

## 2024-09-27 RX ORDER — GLATIRAMER ACETATE 20 MG/ML
INJECTION, SOLUTION SUBCUTANEOUS
Qty: 30 ML | Refills: 0 | Status: SHIPPED | OUTPATIENT
Start: 2024-09-27

## 2024-10-03 ENCOUNTER — APPOINTMENT (OUTPATIENT)
Dept: PRIMARY CARE | Facility: CLINIC | Age: 64
End: 2024-10-03
Payer: COMMERCIAL

## 2024-10-14 ENCOUNTER — APPOINTMENT (OUTPATIENT)
Dept: NEUROLOGY | Facility: CLINIC | Age: 64
End: 2024-10-14
Payer: COMMERCIAL

## 2024-10-14 VITALS
HEART RATE: 71 BPM | SYSTOLIC BLOOD PRESSURE: 165 MMHG | WEIGHT: 233 LBS | BODY MASS INDEX: 36.57 KG/M2 | DIASTOLIC BLOOD PRESSURE: 78 MMHG | HEIGHT: 67 IN

## 2024-10-14 DIAGNOSIS — F40.240 CLAUSTROPHOBIA: ICD-10-CM

## 2024-10-14 DIAGNOSIS — G35 MULTIPLE SCLEROSIS (MULTI): Primary | ICD-10-CM

## 2024-10-14 RX ORDER — DIAZEPAM 5 MG/1
10 TABLET ORAL
Qty: 2 TABLET | Refills: 0 | Status: SHIPPED | OUTPATIENT
Start: 2024-10-14 | End: 2024-10-14

## 2024-10-14 NOTE — PROGRESS NOTES
Subjective     Apryl Chan is a 64 y.o. year old female seen in follow-up for multiple sclerosis on Glatopa.    HPI    64-year-old left-handed woman with past medical history significant for hypertension, diabetes, iron deficiency anemia, monoclonal gammopathy followed by hematology, gallstones and uterine fibroids. She is a smoker.     I've followed her since 2010 for multiple sclerosis. This includes multifocal cervical and thoracic myelitis and multiple T2 hyperintensities on brain MRI with mild progression. She was initially on Copaxone and ultimately transitioned to Glatopa.      She has also voiced a complaint of numbness and tingling and occasional discomfort over the right lateral thigh, which I have suspected to represent lateral femoral cutaneous neuropathy.     Brain MRI was done in April 2019 showed a single new nonenhancing 10 mm right frontal centrum semiovale lesion compared to the prior study from 2017. When I discussed this with her, she indicated several months during which she was taking Glatopa me sporadically, about 2 days a week. Accordingly, I stressed compliance rather than changing to a different disease modifying drug at that time.     I also saw her in early 2019 for an EMG because of left intrinsic hand muscle weakness, which did not show evidence of ulnar neuropathy or other abnormalities.      I evaluated her again on 8/26/2022, returning to the office after a long interval. Her last prior office visit had been in late 2019 and she followed up telephonically in July 2020. She reported no interval symptoms to clearly implicate MS relapse. She continued on Glatopa without noted side effects. I did recommend MRIs of the brain and cervical spine at that time, which were accomplished in September. Her MRIs showed no enhancing lesions and no new T2 lesions.    I evaluated her most recently on 4/28/2023 at which visit she appeared neurologically stable and indicated tolerating Glatopa  well.  We discussed the importance of smoking cessation.    She is evaluated again today in the office.    Despite the long interval since last visit she indicates no new complaints suggestive of MS relapse.  She feels she is doing well.    She specifically denies visual complaints albeit she has not undergone a recent eye exam.  She denies new sensory or motor complaints.  She feels steady on her feet for the most part and denies interval falls.  She does not use assistive devices for ambulation.  She denies incomplete bladder emptying, urgency/frequency or other bladder complaints.  She denies bowel complaints.  She feels that her energy level is reasonably good.    She has noted that she cannot do the Glatopa injections in the arms because they are too painful, so sticks with lower extremity sites.  She otherwise tolerates Glatopa well.    She denies headaches.    She continues smoking, estimating 2-3 cigarettes/day.  She indicates that she has tried to quit.  She has tried the gum without durable results.    She is taking daily vitamin D.    Review of Systems    As per the history of present illness    Patient Active Problem List   Diagnosis    Obesity, morbid (Multi)    Need for vaccination    COPD (chronic obstructive pulmonary disease) (Multi)     Past Medical History:   Diagnosis Date    Age-related nuclear cataract, left eye 08/27/2015    Age-related nuclear cataract of left eye    Age-related nuclear cataract, right eye 01/31/2017    Age-related nuclear cataract of right eye    Chronic obstructive pulmonary disease, unspecified (Multi) 04/13/2015    COPD (chronic obstructive pulmonary disease)    Hypertension     Multiple sclerosis (Multi) 07/15/2015    History of multiple sclerosis    Multiple sclerosis (Multi) 11/02/2022    Multiple sclerosis    Other conditions influencing health status 06/04/2021    Background diabetic retinopathy of left eye without macular edema determined by association associated  with type 2 diabetes mellitus    Personal history of other diseases of the circulatory system 07/15/2015    History of hypertension    Personal history of other diseases of the respiratory system     History of chronic obstructive lung disease    Personal history of other endocrine, nutritional and metabolic disease     History of insulin dependent diabetes mellitus    Type 2 diabetes mellitus without complications (Multi) 02/23/2022    Diabetes mellitus type 2, controlled    Unspecified blepharitis left eye, unspecified eyelid 01/31/2017    Blepharitis of left eye     No past surgical history on file.  Social History     Tobacco Use    Smoking status: Every Day     Types: Cigarettes    Smokeless tobacco: Current   Substance Use Topics    Alcohol use: Not Currently     family history is not on file.    Current Outpatient Medications:     albuterol 90 mcg/actuation inhaler, INHALE 1 TO 2 PUFFS BY MOUTH EVERY 4 TO 6 HOURS AS NEEDED AS DIRECTED, Disp: 18 g, Rfl: 11    Alcohol Prep Pads pads, medicated, APPLY 1 SWAB TOPICALLY 1 TIME FOR 1 DOSE., Disp: , Rfl:     amLODIPine (Norvasc) 10 mg tablet, Take 1 tablet (10 mg) by mouth once daily., Disp: 30 tablet, Rfl: 11    ammonium lactate (Amlactin) 12 % cream, APPLY 1 APPLICATION TOPICALLY EVERY DAY, Disp: , Rfl:     aspirin 81 mg EC tablet, Take by mouth., Disp: , Rfl:     blood sugar diagnostic (Blood Glucose Test) strip, Inject 1 strip under the skin once daily., Disp: 30 strip, Rfl: 11    cholecalciferol (Vitamin D-3) 50 mcg (2,000 unit) capsule, Take 1 capsule (50 mcg) by mouth early in the morning.., Disp: 30 capsule, Rfl: 11    cyclobenzaprine (Flexeril) 5 mg tablet, TAKE 1-2 TABLETS DAILY AS NEEDED FOR MUSCLE SPASMS OF NECK OR BACK, Disp: 30 tablet, Rfl: 0    diazePAM (Valium) 5 mg tablet, Take by mouth., Disp: , Rfl:     diclofenac sodium (Voltaren) 1 % gel, APPLY SPARINGLY TO AFFECTED AREA EVERY DAY, Disp: 450 g, Rfl: 3    dulaglutide (Trulicity) 0.75 mg/0.5 mL  pen injector, INJECT 0.5 ML SUBCUTANEOUSLY WEEKLY, Disp: 2 mL, Rfl: 3    dulaglutide 3 mg/0.5 mL pen injector, Inject 3 mg under the skin 1 (one) time per week., Disp: 2 mL, Rfl: 11    FreeStyle Lancets 28 gauge, TEST BLOOD GLUCOSE EVERY MORNING BEFORE EATING, Disp: 100 each, Rfl: 11    FreeStyle Lite Meter kit, Take reading twice/ day, Disp: 1 each, Rfl: 0    FreeStyle Lite Strips strip, TEST ONCE DAILY, Disp: 30 strip, Rfl: 11    Glatopa 20 mg/mL syringe, ADMINISTER 20MG(1 INJECTION) UNDER THE SKIN ONCE DAILY FOR 30 DAYS, Disp: 30 mL, Rfl: 0    glipiZIDE XL (Glucotrol XL) 10 mg 24 hr tablet, Take 1 tablet (10 mg) by mouth once daily. as directed, Disp: 30 tablet, Rfl: 11    ibuprofen 800 mg tablet, Take 1 tablet (800 mg) by mouth every 8 hours if needed for moderate pain (4 - 6)., Disp: 30 tablet, Rfl: 11    ketoconazole (NIZOral) 2 % cream, APPLY TOPICALLY EVERY DAY, Disp: , Rfl:     losartan (Cozaar) 100 mg tablet, Take 1 tablet (100 mg) by mouth once daily., Disp: 90 tablet, Rfl: 11    metFORMIN (Glucophage) 500 mg tablet, Take 2 tablets (1,000 mg) by mouth 2 times daily (morning and late afternoon)., Disp: 400 tablet, Rfl: 3    mv-min-folic acid-lutein (Essential Woman 50 Plus) 0.4-250 mg-mcg tablet, Take 1 tablet by mouth once daily., Disp: , Rfl:     nicotine (Nicoderm CQ) 7 mg/24 hr patch, Place 1 patch over 24 hours on the skin once every 24 hours., Disp: 14 patch, Rfl: 2    pravastatin (Pravachol) 40 mg tablet, Take 1 tablet (40 mg) by mouth once daily at bedtime., Disp: 90 tablet, Rfl: 3  Allergies   Allergen Reactions    Lisinopril Swelling and Unknown    Tramadol Unknown     N&V       Objective   Neurological Exam  Physical Exam    Physical Examination:    General: Alert woman who was ambulatory without assistive devices.      Mental Status: Clear sensorium without fluctuation.  Appropriate in conversation.  Recent and remote recall fair for details of medical history.  Attention and concentration  intact during interview.  [facility] fair for medical information.  Language intact and fluent without paraphasic errors.    Cranial Nerves:  Funduscopic exam was not well visualized bilaterally on nondilated exam.  Pupils were equal, round and reactive to light with no relative afferent pupillary defect.  Extraocular movements were intact and conjugate without nystagmus.  No ptosis.  Visual fields were full to confrontation tested binocularly.  Facial sensation was symmetric to pin.  Facial motor function was symmetrically intact.  Hearing was grossly intact.  No dysarthria.  Shoulder shrug was symmetric.  Tongue protrusion was midline.    Motor: Muscle bulk and tone were normal throughout. There was no pronator drift or asymmetry of finger taps. Confrontation strength was symmetrically 5/5 throughout the upper and lower extremities.     Coordination: Finger to finger, heel to shin, and alternating hand movements were rapid and accurate without dysmetria. There was no tremor, myoclonus or dystonic posturing.     Sensation: Vibration thresholds were normal at the great toes and index fingers. Romberg sign was absent.     Station: Intact and stable.    Gait: Stable and unremarkable. Intact tandem.    Other: Lhermitte sign was absent.      Assessment/Plan     She returns after a long interval but denies episodes to suggest MS relapse.    I recommended repeating the brain MRI for surveillance.  I will request this with gadolinium and I am asking her to have a creatinine checked before hand.  She requested a prescription for diazepam for the MRI as she is claustrophobic, which I provided after reviewing PDMP.  However, I advised her to have a  after the MRI if she takes diazepam for it.    The office will contact her with MRI results after I have compared it to her prior study from 2022.    For the time being she will continue Glatopa.  However, for ongoing care I am referring her to neuro immunology and I  discussed with her that there are a number of other DMT options if she is interested.    I discussed smoking cessation with her and reminded her in particular that smoking increases her risk of stroke, among other medical conditions.  I encouraged her to continue efforts to quit.

## 2024-10-14 NOTE — PATIENT INSTRUCTIONS
As we discussed, I recommend a brain MRI to assess for multiple sclerosis disease activity.  I will have you get a blood test checked first for kidney function.    You indicated claustrophobia and I will prescribe diazepam for the MRI.  However, be sure you have a  after the MRI if you take diazepam.    My office will call with MRI results.    For the time being, continue your present regimen of Glatopa.    We discussed smoking cessation.  Quitting smoking will help to reduce your risk of stroke, as well as other health conditions like heart disease, lung cancer and COPD.    I am providing a referral to neuro immunology for future MS follow-up.

## 2024-10-17 ENCOUNTER — LAB (OUTPATIENT)
Dept: LAB | Facility: LAB | Age: 64
End: 2024-10-17
Payer: COMMERCIAL

## 2024-10-17 DIAGNOSIS — G35 MULTIPLE SCLEROSIS (MULTI): ICD-10-CM

## 2024-10-17 LAB
CREAT SERPL-MCNC: 0.76 MG/DL (ref 0.5–1.05)
EGFRCR SERPLBLD CKD-EPI 2021: 89 ML/MIN/1.73M*2

## 2024-11-04 DIAGNOSIS — G35 MULTIPLE SCLEROSIS (MULTI): ICD-10-CM

## 2024-11-04 RX ORDER — GLATIRAMER ACETATE 20 MG/ML
INJECTION, SOLUTION SUBCUTANEOUS
Qty: 30 ML | Refills: 2 | Status: SHIPPED | OUTPATIENT
Start: 2024-11-04

## 2024-11-12 ENCOUNTER — HOSPITAL ENCOUNTER (OUTPATIENT)
Dept: RADIOLOGY | Facility: HOSPITAL | Age: 64
Discharge: HOME | End: 2024-11-12
Payer: COMMERCIAL

## 2024-11-12 VITALS — BODY MASS INDEX: 36.1 KG/M2 | WEIGHT: 230 LBS | HEIGHT: 67 IN

## 2024-11-12 DIAGNOSIS — Z12.31 ENCOUNTER FOR SCREENING MAMMOGRAM FOR MALIGNANT NEOPLASM OF BREAST: ICD-10-CM

## 2024-11-12 DIAGNOSIS — G35 MULTIPLE SCLEROSIS (MULTI): ICD-10-CM

## 2024-11-12 DIAGNOSIS — Z00.00 ROUTINE GENERAL MEDICAL EXAMINATION AT A HEALTH CARE FACILITY: ICD-10-CM

## 2024-11-12 PROCEDURE — 2550000001 HC RX 255 CONTRASTS: Performed by: PSYCHIATRY & NEUROLOGY

## 2024-11-12 PROCEDURE — 77067 SCR MAMMO BI INCL CAD: CPT

## 2024-11-12 PROCEDURE — 70553 MRI BRAIN STEM W/O & W/DYE: CPT

## 2024-11-12 PROCEDURE — A9575 INJ GADOTERATE MEGLUMI 0.1ML: HCPCS | Performed by: PSYCHIATRY & NEUROLOGY

## 2024-11-12 RX ORDER — GADOTERATE MEGLUMINE 376.9 MG/ML
20 INJECTION INTRAVENOUS
Status: COMPLETED | OUTPATIENT
Start: 2024-11-12 | End: 2024-11-12

## 2024-11-14 ENCOUNTER — TELEPHONE (OUTPATIENT)
Dept: NEUROLOGY | Facility: CLINIC | Age: 64
End: 2024-11-14
Payer: COMMERCIAL

## 2024-11-14 NOTE — TELEPHONE ENCOUNTER
----- Message from Waqas Wray sent at 11/13/2024 11:52 AM EST -----  Please inform her that I reviewed her brain MRI and it is stable.

## 2025-01-06 DIAGNOSIS — I10 HYPERTENSION, UNSPECIFIED TYPE: Primary | ICD-10-CM

## 2025-01-09 ENCOUNTER — LAB (OUTPATIENT)
Dept: LAB | Facility: LAB | Age: 65
End: 2025-01-09
Payer: COMMERCIAL

## 2025-01-09 DIAGNOSIS — I10 HYPERTENSION, UNSPECIFIED TYPE: ICD-10-CM

## 2025-01-09 LAB
CHOLEST SERPL-MCNC: 178 MG/DL (ref 0–199)
CHOLESTEROL/HDL RATIO: 4.8
HDLC SERPL-MCNC: 37 MG/DL
NON-HDL CHOLESTEROL: 141 MG/DL (ref 0–149)

## 2025-01-09 PROCEDURE — 82465 ASSAY BLD/SERUM CHOLESTEROL: CPT

## 2025-01-09 PROCEDURE — 83718 ASSAY OF LIPOPROTEIN: CPT

## 2025-01-17 ENCOUNTER — OFFICE VISIT (OUTPATIENT)
Dept: NEUROLOGY | Facility: HOSPITAL | Age: 65
End: 2025-01-17
Payer: COMMERCIAL

## 2025-01-17 VITALS
BODY MASS INDEX: 36.26 KG/M2 | SYSTOLIC BLOOD PRESSURE: 174 MMHG | DIASTOLIC BLOOD PRESSURE: 81 MMHG | WEIGHT: 231 LBS | HEART RATE: 63 BPM | HEIGHT: 67 IN

## 2025-01-17 DIAGNOSIS — E55.9 VITAMIN D DEFICIENCY: ICD-10-CM

## 2025-01-17 DIAGNOSIS — G35 MULTIPLE SCLEROSIS (MULTI): Primary | ICD-10-CM

## 2025-01-17 DIAGNOSIS — Z00.00 ROUTINE GENERAL MEDICAL EXAMINATION AT A HEALTH CARE FACILITY: ICD-10-CM

## 2025-01-17 PROCEDURE — 99215 OFFICE O/P EST HI 40 MIN: CPT | Performed by: PSYCHIATRY & NEUROLOGY

## 2025-01-17 PROCEDURE — 99417 PROLNG OP E/M EACH 15 MIN: CPT | Performed by: PSYCHIATRY & NEUROLOGY

## 2025-01-17 RX ORDER — GLATIRAMER ACETATE 20 MG/ML
20 INJECTION, SOLUTION SUBCUTANEOUS DAILY
Qty: 30 ML | Refills: 5 | Status: SHIPPED | OUTPATIENT
Start: 2025-01-17

## 2025-01-17 RX ORDER — ACETAMINOPHEN 500 MG
2000 TABLET ORAL DAILY
Qty: 90 CAPSULE | Refills: 3 | Status: SHIPPED | OUTPATIENT
Start: 2025-01-17 | End: 2026-01-12

## 2025-01-17 NOTE — PROGRESS NOTES
[facility] NEUROIMMUNOLOGY EVALUATION    CC: Transfer of care for multiple sclerosis, referred by Dr. Wray    PRINCIPAL NEUROLOGIC DIAGNOSIS: MS    DISEASE SUMMARY:  Onset: 2011  Diagnosis of MS: 2011  Disease course at onset: R  Current disease course: R, stable, inactive  Previous disease therapies: Copaxone  Current disease therapies: Glatopa 3 times a week  Most recent MRI brain:   -11/24 stable compared to prior 2022, typical of MS  -April 2019 MRI showed a single new nonenhancing 10 mm right frontal centrum semiovale lesion compared to the prior study from 2017 (not compliant with DMT at that time)  Most recent MRI cervical spine: 2022 prior 2015  Most recent MRI thoracic spine: 2015  Stable C4/5 level, T2/3 and T6 level cord lesions  CSF: remote, reportedly compatible with MS    HPI:   Previous records (physician notes, laboratory reports, and radiology reports) and imaging studies were reviewed and summarized.      History summary:   64-year-old woman with a diagnosis of multiple sclerosis currently on Glatopa, last seen by Dr. Wray in October 2024.  She also has a history of hypertension, diabetes, monoclonal gammopathy.  She had been following with Dr. Wray since 2010.  For her lateral thigh sensory changes, Dr. Wray had suspected a lateral femoral cutaneous neuropathy at some point.  From Dr. Wray's notes, it appears her MS has been clinically stable.  She cannot do the Glatopa injections in the arms because they are painful, so sticks with lower extremity sites. Gets lumps and soreness from injections but otherwise tolerates Glatopa well.   Patient reports at onset of her MS she had slurred speech and greying of the right eye, not sure when, later around 2011 had an episode of ascending numbness, at which time she was diagnosed with MS. Recovered from both episodes, for the second one received steroids.   No obvious relapses since.    Does not exercise a lot, smokes 2-3 cigarettes a  "day, Takes Vit D    Current symptoms:  Reports some level of depression, no SI, not interested in seeing psych  Urinary Urgency, gets up more than once at night, not a big issue during the day, tried \"something\" in the past  Fatigue, feels mostly refreshed in the am  Stiffness in the lower back   Sometimes the feet are numb, especially the right   Does not take flexeril often    The patient denies cognitive symptoms, diplopia, bulbar symptoms, weakness, incoordination, difficulty with gait, Lhermitte's phenomenon.     ROS  10-point review of systems was negative except as mentioned in the HPI and/or the [facility] form.     PMH  Patient Active Problem List   Diagnosis    Obesity, morbid (Multi)    Need for vaccination    COPD (chronic obstructive pulmonary disease) (Multi)        [facility]  No family history on file.       Social History     Tobacco Use    Smoking status: Every Day     Types: Cigarettes    Smokeless tobacco: Current   Substance Use Topics    Alcohol use: Not Currently    Drug use: Never        ALL  Allergies   Allergen Reactions    Lisinopril Swelling and Unknown    Tramadol Unknown     N&V        MEDS    Current Outpatient Medications:     albuterol 90 mcg/actuation inhaler, INHALE 1 TO 2 PUFFS BY MOUTH EVERY 4 TO 6 HOURS AS NEEDED AS DIRECTED, Disp: 18 g, Rfl: 11    Alcohol Prep Pads pads, medicated, APPLY 1 SWAB TOPICALLY 1 TIME FOR 1 DOSE., Disp: , Rfl:     amLODIPine (Norvasc) 10 mg tablet, Take 1 tablet (10 mg) by mouth once daily., Disp: 30 tablet, Rfl: 11    ammonium lactate (Amlactin) 12 % cream, APPLY 1 APPLICATION TOPICALLY EVERY DAY, Disp: , Rfl:     aspirin 81 mg EC tablet, Take by mouth., Disp: , Rfl:     blood sugar diagnostic (Blood Glucose Test) strip, Inject 1 strip under the skin once daily., Disp: 30 strip, Rfl: 11    cholecalciferol (Vitamin D-3) 50 mcg (2,000 unit) capsule, Take 1 capsule (50 mcg) by mouth early in the morning.., Disp: 30 capsule, Rfl: 11    cyclobenzaprine (Flexeril) 5 mg " tablet, TAKE 1-2 TABLETS DAILY AS NEEDED FOR MUSCLE SPASMS OF NECK OR BACK, Disp: 30 tablet, Rfl: 0    diazePAM (Valium) 5 mg tablet, Take by mouth., Disp: , Rfl:     diazePAM (Valium) 5 mg tablet, Take 2 tablets (10 mg) by mouth 1 time if needed for anxiety for up to 1 dose. Take 2 tablets 30 minutes prior to MRI for claustrophobia., Disp: 2 tablet, Rfl: 0    diclofenac sodium (Voltaren) 1 % gel, APPLY SPARINGLY TO AFFECTED AREA EVERY DAY, Disp: 450 g, Rfl: 3    dulaglutide (Trulicity) 0.75 mg/0.5 mL pen injector, INJECT 0.5 ML SUBCUTANEOUSLY WEEKLY, Disp: 2 mL, Rfl: 3    dulaglutide 3 mg/0.5 mL pen injector, Inject 3 mg under the skin 1 (one) time per week., Disp: 2 mL, Rfl: 11    FreeStyle Lancets 28 gauge, TEST BLOOD GLUCOSE EVERY MORNING BEFORE EATING, Disp: 100 each, Rfl: 11    FreeStyle Lite Meter kit, Take reading twice/ day, Disp: 1 each, Rfl: 0    FreeStyle Lite Strips strip, TEST ONCE DAILY, Disp: 30 strip, Rfl: 11    Glatopa 20 mg/mL syringe, ADMINISTER 20MG(1 INJECTION) UNDER THE SKIN ONCE DAILY FOR 30 DAYS, Disp: 30 mL, Rfl: 2    glipiZIDE XL (Glucotrol XL) 10 mg 24 hr tablet, Take 1 tablet (10 mg) by mouth once daily. as directed, Disp: 30 tablet, Rfl: 11    ibuprofen 800 mg tablet, Take 1 tablet (800 mg) by mouth every 8 hours if needed for moderate pain (4 - 6)., Disp: 30 tablet, Rfl: 11    ketoconazole (NIZOral) 2 % cream, APPLY TOPICALLY EVERY DAY, Disp: , Rfl:     losartan (Cozaar) 100 mg tablet, Take 1 tablet (100 mg) by mouth once daily., Disp: 90 tablet, Rfl: 11    metFORMIN (Glucophage) 500 mg tablet, Take 2 tablets (1,000 mg) by mouth 2 times daily (morning and late afternoon)., Disp: 400 tablet, Rfl: 3    mv-min-folic acid-lutein (Essential Woman 50 Plus) 0.4-250 mg-mcg tablet, Take 1 tablet by mouth once daily., Disp: , Rfl:     nicotine (Nicoderm CQ) 7 mg/24 hr patch, Place 1 patch over 24 hours on the skin once every 24 hours., Disp: 14 patch, Rfl: 2    pravastatin (Pravachol) 40 mg  "tablet, Take 1 tablet (40 mg) by mouth once daily at bedtime., Disp: 90 tablet, Rfl: 3     PHYSICAL EXAM  VITALS  /81   Pulse 63   Ht 1.702 m (5' 7\")   Wt 105 kg (231 lb)   BMI 36.18 kg/m²     Normal general appearance. The patient was alert and oriented to person, place, and time with normal language, attention and concentration, recent and remote memory, praxis, and intellectual function. Normal fund of knowledge. Affect was normal.  The patient did not appear depressed.    No red desaturation. Visual fields were full to confrontation.  Pupils were 6 mm and briskly reactive OU without a relative afferent pupillary defect.  Eye movements: normal fixation, no spontaneous or gaze-evoked nystagmus, normal speed and amplitude of horizontal and vertical saccades, no saccadic dysmetria, normal horizontal smooth pursuit.  Facial sensation to light touch and pinprick was normal.  Muscles of mastication and facial expression moved normally.  Hearing reported mild decrease b/l.  Gag reflex and palatal movements were normal.  Sternocleidomastoid and trapezius power were normal.  Tongue movements were normal.  There was no dysarthria.    There was no pronator drift, no orbiting. Normal bulk and tone.    Muscle Strenght (#/5):  Upper extremity    Deltoids Right 5 Left 5  Biceps Right 5 Left 5  Triceps Right 5 Left 5   Right 5 Left 5  KIARA Right 5 Left 5  Lower extremity    Iliopsoas Right 5 Left 5  Quadriceps Right 5 Left 5  Hamstrings Right 5 Left 5  Tibialis ant Right 5 Left 5  Gastrocn Right 5 Left 5    Reflexes:  Upper extremity    Biceps Right 2+ Left 2+  Triceps Right 2+ Left 2+  Brachiorad Right 2+ Left 2+  Lower extremity    Patellar Right 2+ Left 2+  Achilles Right 1+ Left 1+  Plant Cut Right Down Left Withdraws    Coordination in the arms and legs was intact including point-to-point, rapid-alternating, and fine movements.  Involuntary movements: none.  Light touch, pinprick decreased on the right big toe, " proprioception normal.  Romberg's test was normal.  Standard gait was normal.       Assessment/Plan    64-year-old woman with relapsing multiple sclerosis transferring care from Dr. Wray's practice.  Her multiple sclerosis is stable clinically and radiologically with the last sign of activity being a new lesion in 2019 when she was not compliant with Glatopa.  She prefers to continue Glatopa versus trying other newer agents, which I think is reasonable as her multiple sclerosis is stable.    Importance of adopting a healthy lifestyle, including following a healthy diet, exercising, losing weight, taking Vit D, and of no smoking were discussed.      Diagnoses and all orders for this visit:  Multiple sclerosis (Multi) (Primary)  -     Referral to Neurology  -     cholecalciferol (Vitamin D-3) 50 mcg (2,000 unit) capsule; Take 1 capsule (50 mcg) by mouth early in the morning..  -     Follow Up In Neurology; Future  -     Glatopa 20 mg/mL syringe; Inject 1 mL (20 mg) under the skin once daily.  Routine general medical examination at a health care facility  Vitamin D deficiency  -     cholecalciferol (Vitamin D-3) 50 mcg (2,000 unit) capsule; Take 1 capsule (50 mcg) by mouth early in the morning..

## 2025-02-11 DIAGNOSIS — E11.9 TYPE 2 DIABETES MELLITUS WITHOUT COMPLICATION, WITHOUT LONG-TERM CURRENT USE OF INSULIN (MULTI): ICD-10-CM

## 2025-02-11 DIAGNOSIS — I10 PRIMARY HYPERTENSION: ICD-10-CM

## 2025-02-13 RX ORDER — AMLODIPINE BESYLATE 10 MG/1
10 TABLET ORAL DAILY
Qty: 30 TABLET | Refills: 11 | Status: SHIPPED | OUTPATIENT
Start: 2025-02-13

## 2025-02-13 RX ORDER — GLIPIZIDE 10 MG/1
10 TABLET, FILM COATED, EXTENDED RELEASE ORAL DAILY
Qty: 30 TABLET | Refills: 11 | Status: SHIPPED | OUTPATIENT
Start: 2025-02-13

## 2025-03-04 DIAGNOSIS — Z00.00 ROUTINE GENERAL MEDICAL EXAMINATION AT A HEALTH CARE FACILITY: ICD-10-CM

## 2025-03-10 RX ORDER — ALBUTEROL SULFATE 90 UG/1
INHALANT RESPIRATORY (INHALATION)
Qty: 18 G | Refills: 11 | Status: SHIPPED | OUTPATIENT
Start: 2025-03-10

## 2025-03-12 DIAGNOSIS — E11.9 TYPE 2 DIABETES MELLITUS WITHOUT COMPLICATION, WITHOUT LONG-TERM CURRENT USE OF INSULIN (MULTI): ICD-10-CM

## 2025-03-12 DIAGNOSIS — I10 PRIMARY HYPERTENSION: ICD-10-CM

## 2025-03-12 DIAGNOSIS — E13.9 DIABETES 1.5, MANAGED AS TYPE 2 (MULTI): ICD-10-CM

## 2025-03-12 DIAGNOSIS — I10 ESSENTIAL (PRIMARY) HYPERTENSION: ICD-10-CM

## 2025-03-12 RX ORDER — GLIPIZIDE 10 MG/1
10 TABLET, FILM COATED, EXTENDED RELEASE ORAL DAILY
Qty: 30 TABLET | Refills: 11 | Status: SHIPPED | OUTPATIENT
Start: 2025-03-12

## 2025-03-12 RX ORDER — DULAGLUTIDE 0.75 MG/.5ML
0.75 INJECTION, SOLUTION SUBCUTANEOUS
Qty: 2 ML | Refills: 3 | Status: SHIPPED | OUTPATIENT
Start: 2025-03-16

## 2025-03-12 RX ORDER — AMLODIPINE BESYLATE 10 MG/1
10 TABLET ORAL DAILY
Qty: 30 TABLET | Refills: 11 | Status: SHIPPED | OUTPATIENT
Start: 2025-03-12

## 2025-03-12 RX ORDER — LOSARTAN POTASSIUM 100 MG/1
100 TABLET ORAL DAILY
Qty: 90 TABLET | Refills: 11 | Status: SHIPPED | OUTPATIENT
Start: 2025-03-12 | End: 2028-02-25

## 2025-04-03 ENCOUNTER — OFFICE VISIT (OUTPATIENT)
Facility: HOSPITAL | Age: 65
End: 2025-04-03
Payer: COMMERCIAL

## 2025-04-03 VITALS
DIASTOLIC BLOOD PRESSURE: 75 MMHG | WEIGHT: 230.2 LBS | OXYGEN SATURATION: 97 % | HEIGHT: 67 IN | SYSTOLIC BLOOD PRESSURE: 150 MMHG | TEMPERATURE: 97.5 F | BODY MASS INDEX: 36.13 KG/M2 | HEART RATE: 71 BPM

## 2025-04-03 DIAGNOSIS — Z12.11 ENCOUNTER FOR SCREENING FOR MALIGNANT NEOPLASM OF COLON: ICD-10-CM

## 2025-04-03 DIAGNOSIS — E13.9 DIABETES 1.5, MANAGED AS TYPE 2 (MULTI): ICD-10-CM

## 2025-04-03 DIAGNOSIS — E55.9 VITAMIN D DEFICIENCY: ICD-10-CM

## 2025-04-03 DIAGNOSIS — G35 MULTIPLE SCLEROSIS (MULTI): ICD-10-CM

## 2025-04-03 DIAGNOSIS — M54.40 LOW BACK PAIN WITH SCIATICA, SCIATICA LATERALITY UNSPECIFIED, UNSPECIFIED BACK PAIN LATERALITY, UNSPECIFIED CHRONICITY: ICD-10-CM

## 2025-04-03 DIAGNOSIS — Z00.00 ROUTINE GENERAL MEDICAL EXAMINATION AT A HEALTH CARE FACILITY: Primary | ICD-10-CM

## 2025-04-03 PROCEDURE — 99386 PREV VISIT NEW AGE 40-64: CPT | Performed by: STUDENT IN AN ORGANIZED HEALTH CARE EDUCATION/TRAINING PROGRAM

## 2025-04-03 RX ORDER — LANCETS 28 GAUGE
EACH MISCELLANEOUS
Qty: 100 EACH | Refills: 11 | Status: SHIPPED | OUTPATIENT
Start: 2025-04-03

## 2025-04-03 RX ORDER — IBUPROFEN 800 MG/1
800 TABLET ORAL EVERY 8 HOURS PRN
Qty: 30 TABLET | Refills: 11 | Status: SHIPPED | OUTPATIENT
Start: 2025-04-03

## 2025-04-03 RX ORDER — ACETAMINOPHEN 500 MG
2000 TABLET ORAL DAILY
Qty: 90 CAPSULE | Refills: 3 | Status: SHIPPED | OUTPATIENT
Start: 2025-04-03 | End: 2026-03-29

## 2025-04-03 RX ORDER — BLOOD-GLUCOSE METER
KIT MISCELLANEOUS
Qty: 90 STRIP | Refills: 3 | Status: SHIPPED | OUTPATIENT
Start: 2025-04-03

## 2025-04-03 ASSESSMENT — ENCOUNTER SYMPTOMS
DIAPHORESIS: 0
SHORTNESS OF BREATH: 0
DEPRESSION: 1
BACK PAIN: 0
HEADACHES: 0
PHOTOPHOBIA: 0
FATIGUE: 0
NUMBNESS: 0
CONSTIPATION: 0
COUGH: 0
DIARRHEA: 0
RHINORRHEA: 0
HEMATURIA: 0
EYE DISCHARGE: 0
AGITATION: 0
CONFUSION: 0
LOSS OF SENSATION IN FEET: 0
ABDOMINAL PAIN: 0
UNEXPECTED WEIGHT CHANGE: 0
ACTIVITY CHANGE: 0
OCCASIONAL FEELINGS OF UNSTEADINESS: 0
FEVER: 0
DYSURIA: 0

## 2025-04-03 ASSESSMENT — PATIENT HEALTH QUESTIONNAIRE - PHQ9
SUM OF ALL RESPONSES TO PHQ9 QUESTIONS 1 AND 2: 0
2. FEELING DOWN, DEPRESSED OR HOPELESS: NOT AT ALL
1. LITTLE INTEREST OR PLEASURE IN DOING THINGS: NOT AT ALL

## 2025-04-03 ASSESSMENT — PAIN SCALES - GENERAL: PAINLEVEL_OUTOF10: 0-NO PAIN

## 2025-04-03 NOTE — PROGRESS NOTES
"  Subjective   Patient ID: Apryl Chan is a 64 y.o. female who presents for Med Refill.     [facility]  Med refills  Lab work due   No concerns   Mammogram 11/2024  Never had colon Ca screening, would like the Cologuard. No CA in family history   Will make an appointment for a PAP     Review of Systems   Constitutional:  Negative for activity change, diaphoresis, fatigue, fever and unexpected weight change.   HENT:  Negative for congestion, rhinorrhea and sneezing.    Eyes:  Negative for photophobia and discharge.   Respiratory:  Negative for cough and shortness of breath.    Cardiovascular:  Negative for chest pain.   Gastrointestinal:  Negative for abdominal pain, constipation and diarrhea.   Endocrine: Negative for cold intolerance and heat intolerance.   Genitourinary:  Negative for dysuria and hematuria.   Musculoskeletal:  Negative for back pain and gait problem.   Neurological:  Negative for numbness and headaches.   Psychiatric/Behavioral:  Negative for agitation and confusion.        Objective   /75 (BP Location: Right arm, Patient Position: Sitting, BP Cuff Size: Adult)   Pulse 71   Temp 36.4 °C (97.5 °F) (Temporal)   Ht 1.702 m (5' 7\")   Wt 104 kg (230 lb 3.2 oz)   SpO2 97%   BMI 36.05 kg/m²     Physical Exam  Constitutional:       Appearance: Normal appearance.   HENT:      Head: Normocephalic.      Right Ear: Tympanic membrane, ear canal and external ear normal.      Left Ear: Tympanic membrane, ear canal and external ear normal.      Nose: Nose normal.      Mouth/Throat:      Mouth: Mucous membranes are moist.      Comments: Edentulous upper mandible    Eyes:      Extraocular Movements: Extraocular movements intact.      Pupils: Pupils are equal, round, and reactive to light.   Cardiovascular:      Rate and Rhythm: Normal rate and regular rhythm.      Pulses: Normal pulses.      Heart sounds: Normal heart sounds.   Pulmonary:      Effort: Pulmonary effort is normal.      Breath sounds: " Normal breath sounds.   Abdominal:      Palpations: Abdomen is soft.   Skin:     Capillary Refill: Capillary refill takes less than 2 seconds.   Neurological:      Mental Status: She is alert and oriented to person, place, and time.       Assessment/Plan   Problem List Items Addressed This Visit    None  Visit Diagnoses         Codes    Routine general medical examination at a health care facility    -  Primary Z00.00    Relevant Orders    HIV 1/2 Antigen/Antibody Screen with Reflex to Confirmation    Hepatitis C antibody    Cologuard® colon cancer screening    Diabetes 1.5, managed as type 2 (Multi)     E13.9    Relevant Medications    FreeStyle Lite Strips    FreeStyle Lancets 28 gauge    Other Relevant Orders    Hemoglobin A1c    Comprehensive metabolic panel    CBC    Albumin-Creatinine Ratio, Urine Random    Low back pain with sciatica, sciatica laterality unspecified, unspecified back pain laterality, unspecified chronicity     M54.40    Relevant Medications    ibuprofen 800 mg tablet    Multiple sclerosis (Multi)     G35    Relevant Medications    cholecalciferol (Vitamin D-3) 50 mcg (2,000 unit) capsule    Vitamin D deficiency     E55.9    Relevant Medications    cholecalciferol (Vitamin D-3) 50 mcg (2,000 unit) capsule    Encounter for screening for malignant neoplasm of colon     Z12.11    Relevant Orders    Cologuard® colon cancer screening                 Note: This documentaion was entered into the electronic medical record using "[employer]" medical dictation software, and was not necessarily edited thereafter. Please excuse any errors of spelling or grammar.

## 2025-04-04 LAB
ALBUMIN/CREAT UR: 41 MG/G CREAT
CREAT UR-MCNC: 210 MG/DL (ref 20–275)
MICROALBUMIN UR-MCNC: 8.6 MG/DL

## 2025-04-05 LAB
ALBUMIN SERPL-MCNC: 4.4 G/DL (ref 3.6–5.1)
ALP SERPL-CCNC: 90 U/L (ref 37–153)
ALT SERPL-CCNC: 32 U/L (ref 6–29)
ANION GAP SERPL CALCULATED.4IONS-SCNC: 12 MMOL/L (CALC) (ref 7–17)
AST SERPL-CCNC: 24 U/L (ref 10–35)
BILIRUB SERPL-MCNC: 0.5 MG/DL (ref 0.2–1.2)
BUN SERPL-MCNC: 14 MG/DL (ref 7–25)
CALCIUM SERPL-MCNC: 9.8 MG/DL (ref 8.6–10.4)
CHLORIDE SERPL-SCNC: 104 MMOL/L (ref 98–110)
CO2 SERPL-SCNC: 25 MMOL/L (ref 20–32)
CREAT SERPL-MCNC: 0.77 MG/DL (ref 0.5–1.05)
EGFRCR SERPLBLD CKD-EPI 2021: 87 ML/MIN/1.73M2
ERYTHROCYTE [DISTWIDTH] IN BLOOD BY AUTOMATED COUNT: 12.6 % (ref 11–15)
EST. AVERAGE GLUCOSE BLD GHB EST-MCNC: 229 MG/DL
EST. AVERAGE GLUCOSE BLD GHB EST-SCNC: 12.7 MMOL/L
GLUCOSE SERPL-MCNC: 158 MG/DL (ref 65–99)
HBA1C MFR BLD: 9.6 % OF TOTAL HGB
HCT VFR BLD AUTO: 41.1 % (ref 35–45)
HCV AB SERPL QL IA: NORMAL
HGB BLD-MCNC: 13.6 G/DL (ref 11.7–15.5)
HIV 1+2 AB+HIV1 P24 AG SERPL QL IA: NORMAL
MCH RBC QN AUTO: 31.4 PG (ref 27–33)
MCHC RBC AUTO-ENTMCNC: 33.1 G/DL (ref 32–36)
MCV RBC AUTO: 94.9 FL (ref 80–100)
PLATELET # BLD AUTO: 252 THOUSAND/UL (ref 140–400)
PMV BLD REES-ECKER: 12.3 FL (ref 7.5–12.5)
POTASSIUM SERPL-SCNC: 4.8 MMOL/L (ref 3.5–5.3)
PROT SERPL-MCNC: 7.6 G/DL (ref 6.1–8.1)
RBC # BLD AUTO: 4.33 MILLION/UL (ref 3.8–5.1)
SODIUM SERPL-SCNC: 141 MMOL/L (ref 135–146)
WBC # BLD AUTO: 7.6 THOUSAND/UL (ref 3.8–10.8)

## 2025-04-07 DIAGNOSIS — E13.9 DIABETES 1.5, MANAGED AS TYPE 2 (MULTI): Primary | ICD-10-CM

## 2025-04-17 ENCOUNTER — TELEMEDICINE (OUTPATIENT)
Dept: PHARMACY | Facility: HOSPITAL | Age: 65
End: 2025-04-17
Payer: COMMERCIAL

## 2025-04-17 DIAGNOSIS — E13.9 DIABETES 1.5, MANAGED AS TYPE 2 (MULTI): ICD-10-CM

## 2025-04-17 NOTE — PROGRESS NOTES
Primary Care Clinical Pharmacist Initial Visit      Date of Initial Visit: 4/17/25  Disease state(s) to be managed by clinical pharmacist: DMT1.5 (managed as DMT2)  Referring Provider: Dr. Kermit Khan (PCP)  Most recent appointment with PCP: 4/3/25  Next appointment with PCP: 6/18/25        Subjective:    Patient is a 63 YO F who presents virtually to visit with clinical pharmacist for initial visit for management of DMT1.5 (managed as DMT2). Patient gives consent to have visit conducted via telehealth. Patient was referred to clinical pharmacist for management of disease state(s) by PCP. At today's visit, patient reports taking metformin 1000mg PO BID, glipizide XL 10mg PO daily, and Trulicity 0.75mg subcutaneous weekly (took 3rd dose yesterday) and reports compliance with meds and denies any ADRs to meds at these doses (of note, cannot tolerate Trulicity 3mg subcutaneous weekly).     Patient reports checking glucose level (morning fasting) at home but did not have home glucose readings available at today's visit with clinical pharmacist. Per patient, home glucose readings have ranged from 140-195 mg/dL since most recent PCP appointment.     Objective:    Most recent hemoglobin A1C level: 9.6% (4/3/25) (goal: less than 7%)    Most recent eGFR: 87 (4/3/25)  Most recent vitamin B12 level:  none on file for the past 12 months  Most recent Albumin/SCr ratio:  41 (4/3/25)- patient already receiving nephroprotection via losartan 100mg PO daily  Most recent AST/ALT: 24/32 (4/3/25)  Most recent diabetic retinopathy exam: unknown   Most recent influenza vaccine: 11/2024  Most recent pneumococcal vaccine: 2/22/24 (PCV20)    Below are the results of patient's most recent lipid panel (non-fasting) (from 1/9/25):    LDL:   not calculated             TG :  not calculated   HDL:   37  (goal: greater than 50)     TC: 178 (goal: less than 200)      Assessment:    Patient's DM is currently uncontrolled based on most recent hemoglobin A1C level of 9.6% (4/3/25). Patient currently on metformin 1000mg PO BID, glipizide XL 10mg PO daily, and Trulicity 0.75mg subcutaneous weekly (took 3rd dose yesterday) and reports compliance with meds and denies any ADRs to meds at these doses. Patient checks glucose level at home but did not have home glucose readings available at today's visit.    Plan:    - Continue metformin 1000mg PO BID    - Continue glipizide XL 10mg PO daily  - Continue Trulicity 0.75mg subcutaneous weekly  - Continue checking glucose level at home every day (morning fasting) and have readings available at next visit with clinical pharmacist          - Instructed patient to notify clinical pharmacist if she ever obtains a home glucose reading of less than 70 mg/dL      The patient verbalized understanding of everything discussed at today's visit and agrees with plan formulated by clinical pharmacist    Next visit with clinical pharmacist:  4/30/25 at 2 PM (telehealth)      Continue all meds under the continuation of care with the referring provider and clinical pharmacy team.    Patient Education    The patient was counseled on the following regarding DMT2    - The side effects of the medications patient uses to treat disease state(s) and what to do if they occur    - How to correct hypoglycemia, if it ever occurs      TRINO Murry, PharmD, BCACP  Clinical Pharmacy Specialist, [facility]

## 2025-04-18 NOTE — ASSESSMENT & PLAN NOTE
Assessment:    Patient's DM is currently uncontrolled based on most recent hemoglobin A1C level of 9.6% (4/3/25). Patient currently on metformin 1000mg PO BID, glipizide XL 10mg PO daily, and Trulicity 0.75mg subcutaneous weekly (took 3rd dose yesterday) and reports compliance with meds and denies any ADRs to meds at these doses. Patient checks glucose level at home but did not have home glucose readings available at today's visit.    Plan:    - Continue metformin 1000mg PO BID    - Continue glipizide XL 10mg PO daily  - Continue Trulicity 0.75mg subcutaneous weekly  - Continue checking glucose level at home every day (morning fasting) and have readings available at next visit with clinical pharmacist          - Instructed patient to notify clinical pharmacist if she ever obtains a home glucose reading of less than 70 mg/dL

## 2025-04-18 NOTE — PROGRESS NOTES
Primary Care Clinical Pharmacist Follow-Up Visit   Patient ID: Apryl Chan is a 64 y.o. female who presents for No chief complaint on file..    Patient was referred to clinical pharmacy for management of type 1.5 diabetes(managed as DMT2). Patient gives consent to have visit conducted via telehealth. At last pharmacy appointment, patient was instructed to continue metformin 1000mg PO BID, glipizide XL 10mg PO daily and Trulicity 0.75mg subcutaneous weekly.  At today's pharmacy appointment, patient reports    Per chart, patient is unable to tolerate Trulicity 3mg injection.    Date of Follow-Up Visit: 4/30/25  Date of Initial Visit: 4/17/25  Referring provider: Kermit Khan MD  Most recent PCP appointment: 4/3/25   Next appointment with PCP: 6/18/25    Subjective   HPI    Diet: ***    Exercise: ***    Allergies[1]    Current DM pharmacotherapy:   - metformin 1000mg PO BID  - glipizide XL 10mg PO daily  - Trulicity 0.75mg subcutaneous weekly    Adherence: {Adherence:15200}  Adverse Effects: ***    Secondary prevention:  - Statin? Yes   - pravastatin 40mg PO daily  - ACE-I/ARB? Yes   - losartan 100mg PO daily  - Aspirin? Yes   - aspirin 81mg PO two times weekly??***    Current monitoring regimen:   Patient is using: glucometer    Testing frequency: 1 time per day(morning fasting)    SMBG readings: ***    Before breakfast: *** (Avg =***mg/dL)    Any episodes of hypoglycemia? {yes,no:21211}  Hypoglycemia awareness? {yes,no:21211}    Pertinent PMH review:  - PMH of Pancreatitis: No  - PMH/FH of Medullary Thyroid Cancer: No  - PMH/FH of Multiple Endocrine Neoplasia (MEN) Type II: No  - PMH of Retinopathy: Yes,  diabetic retinopathy of left eye without macular edema***  - PMH of Urinary Tract Infections/Yeast Infections: {YES,NO:15239}    Last optometry exam: ***  Most recent visit in Podiatry: ***-- patient *** sores or cuts on feet today  Most recent influenza vaccine: 11/24  Most recent pneumococcal  "vaccine: 24    Patient goals:  - Fasting B - 130 mg/dL  - Postprandial BG: less than 180 mg/dL  - A1c less than 7%    Objective   Lab Review  Lab Results   Component Value Date    BILITOT 0.5 2025    CALCIUM 9.8 2025    CO2 25 2025     2025    CREATININE 0.77 2025    GLUCOSE 158 (H) 2025    ALKPHOS 90 2025    K 4.8 2025    PROT 7.6 2025     2025    AST 24 2025    ALT 32 (H) 2025    BUN 14 2025    ANIONGAP 12 2025    PHOS 3.8 2024    ALBUMIN 4.4 2025    LIPASE 20 2022    GFRF >90 2023     Lab Results   Component Value Date    TRIG 235 (H) 2023    CHOL 178 2025    HDL 37.0 2025     Lab Results   Component Value Date    HGBA1C 9.6 (H) 2025     Lab Results   Component Value Date    EGFR 87 2025    EGFR 89 10/17/2024    EGFR >90 2024     No components found for: \"UACR\"  BP Readings from Last 3 Encounters:   25 150/75   25 174/81   10/14/24 165/78     BMI Readings from Last 1 Encounters:   25 36.05 kg/m²     The 10-year ASCVD risk score (Anil BOUCHER, et al., 2019) is: 47%    Values used to calculate the score:      Age: 64 years      Sex: Female      Is Non- : Yes      Diabetic: Yes      Tobacco smoker: Yes      Systolic Blood Pressure: 150 mmHg      Is BP treated: Yes      HDL Cholesterol: 37 mg/dL      Total Cholesterol: 178 mg/dL    Assessment/Plan   Problem List Items Addressed This Visit    None      Type 2 diabetes mellitus is not at goal.   Current A1C: 9.6% (4/3/25)  Goal A1C: <7%    Discussion: ***  Plan: ***  Follow-up: *** weeks; ***  Future considerations: ***   PAP: expires *** (deleted if not enrolled)    Tahir Correia RPh, PharmD  [employer] PGY-1 Pharmacy Resident  [facility]         [1]   Allergies  Allergen Reactions    Lisinopril Swelling and Unknown    Tramadol Unknown     N&V     " goal. At today's pharmacy appointment, patient reports taking metformin 1000mg PO BID, glipizide XL 10mg PO daily and Trulicity 0.75mg subcutaneous weekly. Patient reports compliance and denies having ADRs to the meds. During the visit, patient states she is willing to try Trulicity 1.5 mg injection for glycemic control.  Plan:   Increase Trulicity from 0.75mg subcutaneous weekly to 1.5mg subcutaneous weekly  Prescription electronically sent to patient's preferred pharmacy  Counseled patient on possible side effects of Trulicity and what to do if they occur  Continue metformin 1000mg(two 500mg tablet) PO BID  Continue glipizide XL 10mg PO daily  Continue checking glucose level at home every day (morning fasting) and have readings available at next visit with clinical pharmacist          Relevant Medications    dulaglutide (Trulicity) 1.5 mg/0.5 mL pen injector injection    Other Relevant Orders    Referral to Clinical Pharmacy       Type 2 diabetes mellitus is not at goal.   Current A1C: 9.6% (4/3/25)  Goal A1C: <7%    Follow-up: 3 weeks; 5/21/25 at 2 PM via telehealth  Future considerations: Increase Trulicity to 3mg subcutaneous weekly vs initiating Jaridance if additional glycemic control is needed    Tahir Correia RP, PharmD  [employer] PGY-1 Pharmacy Resident  [facility]      Addendum:     I have reviewed the resident's progress note associated with the patient's visit, and I agree with the resident's assessment and plan.      Gustavo Payne, PharmD, BCACP  Clinical Pharmacy Specialist  [facility]       [1]   Allergies  Allergen Reactions    Lisinopril Swelling and Unknown    Tramadol Unknown     N&V

## 2025-04-24 ENCOUNTER — APPOINTMENT (OUTPATIENT)
Facility: HOSPITAL | Age: 65
End: 2025-04-24
Payer: COMMERCIAL

## 2025-04-30 ENCOUNTER — APPOINTMENT (OUTPATIENT)
Dept: PHARMACY | Facility: HOSPITAL | Age: 65
End: 2025-04-30
Payer: COMMERCIAL

## 2025-04-30 DIAGNOSIS — E13.9 DIABETES 1.5, MANAGED AS TYPE 2 (MULTI): ICD-10-CM

## 2025-04-30 RX ORDER — DULAGLUTIDE 1.5 MG/.5ML
1.5 INJECTION, SOLUTION SUBCUTANEOUS WEEKLY
Qty: 2 ML | Refills: 11 | Status: SHIPPED | OUTPATIENT
Start: 2025-04-30

## 2025-04-30 NOTE — ASSESSMENT & PLAN NOTE
Discussion: Patient's T2DM is uncontrolled based on most recent A1C(9.6%, 4/3/25) and average self-reported morning fasting blood glucose level above the goal. At today's pharmacy appointment, patient reports taking metformin 1000mg PO BID, glipizide XL 10mg PO daily and Trulicity 0.75mg subcutaneous weekly. Patient reports compliance and denies having ADRs to the meds. During the visit, patient states she is willing to try Trulicity 1.5 mg injection for glycemic control.  Plan:   Increase Trulicity from 0.75mg subcutaneous weekly to 1.5mg subcutaneous weekly  Prescription electronically sent to patient's preferred pharmacy  Counseled patient on possible side effects of Trulicity and what to do if they occur  Continue metformin 1000mg(two 500mg tablet) PO BID  Continue glipizide XL 10mg PO daily  Continue checking glucose level at home every day (morning fasting) and have readings available at next visit with clinical pharmacist

## 2025-04-30 NOTE — PROGRESS NOTES
Primary Care Clinical Pharmacist Follow-Up Visit   Patient ID: Apryl Chan is a 64 y.o. female who presents for No chief complaint on file..    Patient was referred to clinical pharmacy for management of type 1.5 diabetes(managed as DMT2). Patient gives consent to have visit conducted via telehealth. At last pharmacy appointment, patient was instructed to increase Trulicity from 0.75mg subcutaneous weekly to 1.5mg subcutaneous weekly, continue metformin 1000mg PO BID and glipizide XL 10mg PO daily.    At today's pharmacy appointment, patient reports     Per chart, patient is unable to tolerate Trulicity 3mg injection due to GI side effects.    Date of Follow-Up Visit: 5/21/25  Date of Initial Visit: 4/17/25  Referring provider: Kermit Khan MD  Most recent PCP appointment: 4/3/25   Next appointment with PCP: 6/18/25    Subjective   HPI    Allergies[1]    Current DM pharmacotherapy:   - metformin 1000mg(two 500mg tablet) PO BID  - glipizide XL 10mg PO daily  - Trulicity 1.5mg subcutaneous weekly    Adherence: {Adherence:71229}  Adverse Effects: ***    Secondary prevention:  - Statin? Yes   - pravastatin 40mg PO daily  - ACE-I/ARB? Yes   - losartan 100mg PO daily  - Aspirin? Yes   - aspirin 81mg PO two times weekly     Current monitoring regimen:   Patient is using: glucometer    Testing frequency: 1 time per day(morning fasting)    SMBG readings:    Before breakfast: *** (Avg =***mg/dL)    Any episodes of hypoglycemia? {yes,no:21211}  Hypoglycemia awareness? {yes,no:21211}    Pertinent PMH review:  - PMH of Pancreatitis: No  - PMH/FH of Medullary Thyroid Cancer: No  - PMH/FH of Multiple Endocrine Neoplasia (MEN) Type II: No  - PMH of Retinopathy: Yes,  diabetic retinopathy of left eye without macular edema  - PMH of Urinary Tract Infections/Yeast Infections: Not in the past 12 months    Last optometry exam: Not in the past 12 months  Most recent visit in Podiatry: Not in the past 12 months  Most  "recent influenza vaccine:   Most recent pneumococcal vaccine: 24    Patient goals:  - Fasting B - 130 mg/dL  - Postprandial BG: less than 180 mg/dL  - A1c less than 7%    Objective   Lab Review  Lab Results   Component Value Date    BILITOT 0.5 2025    CALCIUM 9.8 2025    CO2 25 2025     2025    CREATININE 0.77 2025    GLUCOSE 158 (H) 2025    ALKPHOS 90 2025    K 4.8 2025    PROT 7.6 2025     2025    AST 24 2025    ALT 32 (H) 2025    BUN 14 2025    ANIONGAP 12 2025    PHOS 3.8 2024    ALBUMIN 4.4 2025    LIPASE 20 2022    GFRF >90 2023     Lab Results   Component Value Date    TRIG 235 (H) 2023    CHOL 178 2025    HDL 37.0 2025     Lab Results   Component Value Date    HGBA1C 9.6 (H) 2025     Lab Results   Component Value Date    EGFR 87 2025    EGFR 89 10/17/2024    EGFR >90 2024     No components found for: \"UACR\"  BP Readings from Last 3 Encounters:   25 150/75   25 174/81   10/14/24 165/78     BMI Readings from Last 1 Encounters:   25 36.05 kg/m²     The 10-year ASCVD risk score (Anil BOUCHER, et al., 2019) is: 47%    Values used to calculate the score:      Age: 64 years      Sex: Female      Is Non- : Yes      Diabetic: Yes      Tobacco smoker: Yes      Systolic Blood Pressure: 150 mmHg      Is BP treated: Yes      HDL Cholesterol: 37 mg/dL      Total Cholesterol: 178 mg/dL    Assessment/Plan   Problem List Items Addressed This Visit    None        Type 2 diabetes mellitus is not at goal.   Current A1C: 9.6% (4/3/25)  Goal A1C: <7%    Discussion: ***  Plan: ***  Follow-up: *** weeks; ***  Future considerations: ***    Tahir Correia RPh, PharmD  [employer] PGY-1 Pharmacy Resident  [facility]         [1]   Allergies  Allergen Reactions    Lisinopril Swelling and Unknown    Tramadol Unknown     " N&V

## 2025-05-21 ENCOUNTER — APPOINTMENT (OUTPATIENT)
Dept: PHARMACY | Facility: HOSPITAL | Age: 65
End: 2025-05-21
Payer: COMMERCIAL

## 2025-05-21 NOTE — PROGRESS NOTES
Primary Care Clinical Pharmacist Follow-Up Visit   Patient ID: Apryl Chan is a 64 y.o. female who presents for No chief complaint on file..    Patient was referred to clinical pharmacy for management of type 1.5 diabetes(managed as DMT2). Patient gives consent to have visit conducted via telehealth. At last pharmacy appointment, patient was instructed to increase Trulicity from 0.75mg subcutaneous weekly to 1.5mg subcutaneous weekly, continue metformin 1000mg PO BID and glipizide XL 10mg PO daily.    At today's pharmacy appointment, patient reports     Per chart, patient is unable to tolerate Trulicity 3mg injection due to GI side effects.    Date of Follow-Up Visit: 5/28/25  Date of Initial Visit: 4/17/25  Referring provider: Kermit Khan MD  Most recent PCP appointment: 4/3/25   Next appointment with PCP: 6/18/25    Subjective   HPI    Allergies[1]    Current DM pharmacotherapy:   - metformin 1000mg(two 500mg tablet) PO BID  - glipizide XL 10mg PO daily  - Trulicity 1.5mg subcutaneous weekly    Adherence: {Adherence:88001}  Adverse Effects: ***    Secondary prevention:  - Statin? Yes   - pravastatin 40mg PO daily  - ACE-I/ARB? Yes   - losartan 100mg PO daily  - Aspirin? Yes   - aspirin 81mg PO two times weekly     Current monitoring regimen:   Patient is using: glucometer    Testing frequency: 1 time per day(morning fasting)    SMBG readings:    Before breakfast: *** (Avg =***mg/dL)    Any episodes of hypoglycemia? No***  Hypoglycemia awareness? Yes***    Pertinent PMH review:  - PMH of Pancreatitis: No  - PMH/FH of Medullary Thyroid Cancer: No  - PMH/FH of Multiple Endocrine Neoplasia (MEN) Type II: No  - PMH of Retinopathy: Yes,  diabetic retinopathy of left eye without macular edema  - PMH of Urinary Tract Infections/Yeast Infections: Not in the past 12 months    Last optometry exam: Not in the past 12 months  Most recent visit in Podiatry: Not in the past 12 months  Most recent influenza  "vaccine:   Most recent pneumococcal vaccine: 24    Patient goals:  - Fasting B - 130 mg/dL  - Postprandial BG: less than 180 mg/dL  - A1c less than 7%    Objective   Lab Review  Lab Results   Component Value Date    BILITOT 0.5 2025    CALCIUM 9.8 2025    CO2 25 2025     2025    CREATININE 0.77 2025    GLUCOSE 158 (H) 2025    ALKPHOS 90 2025    K 4.8 2025    PROT 7.6 2025     2025    AST 24 2025    ALT 32 (H) 2025    BUN 14 2025    ANIONGAP 12 2025    PHOS 3.8 2024    ALBUMIN 4.4 2025    LIPASE 20 2022    GFRF >90 2023     Lab Results   Component Value Date    TRIG 235 (H) 2023    CHOL 178 2025    HDL 37.0 2025     Lab Results   Component Value Date    HGBA1C 9.6 (H) 2025     Lab Results   Component Value Date    EGFR 87 2025    EGFR 89 10/17/2024    EGFR >90 2024     No components found for: \"UACR\"  BP Readings from Last 3 Encounters:   25 150/75   25 174/81   10/14/24 165/78     BMI Readings from Last 1 Encounters:   25 36.05 kg/m²     The 10-year ASCVD risk score (Anil BOUCHER, et al., 2019) is: 47%    Values used to calculate the score:      Age: 64 years      Sex: Female      Is Non- : Yes      Diabetic: Yes      Tobacco smoker: Yes      Systolic Blood Pressure: 150 mmHg      Is BP treated: Yes      HDL Cholesterol: 37 mg/dL      Total Cholesterol: 178 mg/dL    Assessment/Plan   Problem List Items Addressed This Visit    None        Type 2 diabetes mellitus is not at goal.   Current A1C: 9.6% (4/3/25)  Goal A1C: <7%    Discussion: ***  Plan: ***  Follow-up: *** weeks; *** via telehealth  Future considerations: Increase Trulicity to 3mg subcutaneous weekly vs initiating Jaridance if additional glycemic control is needed    Tahir Correia, PharmD  [employer] PGY-1 Pharmacy Resident  [facility]   "     [1]   Allergies  Allergen Reactions    Lisinopril Swelling and Unknown    Tramadol Unknown     N&V      home every day (morning fasting) and have readings available at next visit with clinical pharmacist              Type 2 diabetes mellitus is not at goal.   Current A1C: 9.6% (4/3/25)  Goal A1C: <7%      Follow-up: 2 weeks; 6/11/25 at 1:30 PM via telehealth  Future considerations: Increase Trulicity to 4.5mg subcutaneous weekly vs initiating Jaridance if additional glycemic control is needed    Tahir Correia PharmD  [employer] PGY-1 Pharmacy Resident  [facility]      Addendum:     I have reviewed the resident's progress note associated with the patient's visit, and I agree with the resident's assessment and plan.      Gustavo Payne, PharmD, BCACP  Clinical Pharmacy Specialist  [facility]       [1]   Allergies  Allergen Reactions    Lisinopril Swelling and Unknown    Tramadol Unknown     N&V

## 2025-05-28 ENCOUNTER — APPOINTMENT (OUTPATIENT)
Dept: PHARMACY | Facility: HOSPITAL | Age: 65
End: 2025-05-28
Payer: COMMERCIAL

## 2025-05-28 DIAGNOSIS — E13.9 DIABETES 1.5, MANAGED AS TYPE 2 (MULTI): ICD-10-CM

## 2025-05-28 NOTE — ASSESSMENT & PLAN NOTE
Discussion: Patient's T2DM is uncontrolled based on most recent A1C(9.6%, 4/3/25) and average self-reported morning fasting blood glucose level above the goal. At today's pharmacy appointment, patient reports taking Trulicity 1.5mg subcutaneous weekly, metformin 1000mg PO BID and glipizide XL 10mg PO daily. Patient reports she completed 4th dose of Trulicity 1.5mg injection on 5/28/25. Patient reports compliance to the meds. Patient reports having mild nausea after increasing Trulicity to 1.5mg subcutaneous weekl, and she states it is tolerable.  Per chart, patient is unable to tolerate Trulicity 3mg injection due to GI side effects. Per chart and patient, Trulicity was increased from 0.75mg to 3 mg instead of 1.5mg. Patient was unable to tolerate Trulicity 3mg injection possible due to skipping 1.5mg dose when titrating Trulicity. After discussion with patient regarding possible reason why she could not tolerate Trulicity 3mg injection in the past and possible side effects of Trulicity, patient states she would like to try increasing Trulicity to 3 mg subcutaneous weekly for glycemic control.    Plan:   Increase  Trulicity from 1.5mg subcutaneous weekly to 3mg subcutaneous weekly   Prescription electronically sent to patient's preferred pharmacy  Counseled patient on possible side effects of Trulicity and what to do if they occur  Continue  metformin 1000mg(two 500mg tablet) PO BID   glipizide XL 10mg PO daily  Continue checking glucose level at home every day (morning fasting) and have readings available at next visit with clinical pharmacist  
Negative

## 2025-05-28 NOTE — PROGRESS NOTES
Primary Care Clinical Pharmacist Follow-Up Visit     Patient ID: Apryl Chan is a 64 y.o. female who presents for No chief complaint on file..    Patient was referred to clinical pharmacy for management of type 1.5 diabetes(managed as DMT2). Patient gives consent to have visit conducted via telehealth. Per chart, patient is unable to tolerate Trulicity 3mg injection due to GI side effects. Per chart and patient, Trulicity was increased from 0.75mg to 3 mg instead of 1.5mg. Patient was unable to tolerate Trulicity 3mg injection possible due to skipping 1.5mg dose when titrating Trulicity. At last pharmacy appointment, After discussion with patient regarding possible reason why she could not tolerate Trulicity 3mg injection in the past and possible side effects of Trulicity, patient states she would like to try increasing Trulicity to 3 mg subcutaneous weekly for glycemic control. Patient was instructed to increase Trulicity from 1.5mg subcutaneous weekly to 3mg subcutaneous weekly, continue metformin 1000mg PO BID and glipizide XL 10mg PO daily.    At today's pharmacy appointment, patient reports taking     Date of Follow-Up Visit: 6/11/25  Date of Initial Visit: 4/17/25  Referring provider: Kermit Khan MD  Most recent PCP appointment: 4/3/25   Next appointment with PCP: 6/18/25    Subjective   HPI    Allergies[1]    Current DM pharmacotherapy:   - metformin 1000mg(two 500mg tablet) PO BID  - glipizide XL 10mg PO daily  - Trulicity 3mg subcutaneous weekly    Adherence: {Adherence:76707}  Adverse Effects: ***    Secondary prevention:  - Statin? Yes   - pravastatin 40mg PO daily  - ACE-I/ARB? Yes   - losartan 100mg PO daily  - Aspirin? Yes   - aspirin 81mg PO two times weekly     Current monitoring regimen:   Patient is using: glucometer    Testing frequency: 1 time per day (morning fasting)    SMBG readings:    Before breakfast: *** (Avg =*** mg/dL)    Any episodes of hypoglycemia? No  Hypoglycemia  "awareness? Yes    Pertinent PMH review:  - PMH of Pancreatitis: No  - PMH/FH of Medullary Thyroid Cancer: No  - PMH/FH of Multiple Endocrine Neoplasia (MEN) Type II: No  - PMH of Retinopathy: Yes, diabetic retinopathy of left eye without macular edema  - PMH of Urinary Tract Infections/Yeast Infections: Not in the past 12 months    Last optometry exam: Not in the past 12 months  Most recent visit in Podiatry: Not in the past 12 months  Most recent influenza vaccine:   Most recent pneumococcal vaccine: 24    Patient goals:  - Fasting B - 130 mg/dL  - Postprandial BG: less than 180 mg/dL  - A1c less than 7%    Objective   Lab Review  Lab Results   Component Value Date    BILITOT 0.5 2025    CALCIUM 9.8 2025    CO2 25 2025     2025    CREATININE 0.77 2025    GLUCOSE 158 (H) 2025    ALKPHOS 90 2025    K 4.8 2025    PROT 7.6 2025     2025    AST 24 2025    ALT 32 (H) 2025    BUN 14 2025    ANIONGAP 12 2025    PHOS 3.8 2024    ALBUMIN 4.4 2025    LIPASE 20 2022    GFRF >90 2023     Lab Results   Component Value Date    TRIG 235 (H) 2023    CHOL 178 2025    HDL 37.0 2025     Lab Results   Component Value Date    HGBA1C 9.6 (H) 2025     Lab Results   Component Value Date    EGFR 87 2025    EGFR 89 10/17/2024    EGFR >90 2024     No components found for: \"UACR\"  BP Readings from Last 3 Encounters:   25 150/75   25 174/81   10/14/24 165/78     BMI Readings from Last 1 Encounters:   25 36.05 kg/m²     The 10-year ASCVD risk score (Anil BOUCHER, et al., 2019) is: 47%    Values used to calculate the score:      Age: 64 years      Sex: Female      Is Non- : Yes      Diabetic: Yes      Tobacco smoker: Yes      Systolic Blood Pressure: 150 mmHg      Is BP treated: Yes      HDL Cholesterol: 37 mg/dL      Total Cholesterol: 178 " mg/dL    Assessment/Plan   Problem List Items Addressed This Visit    None          Type 2 diabetes mellitus is not at goal.   Current A1C: 9.6% (4/3/25)  Goal A1C: <7%     Discussion: ***  Plan: ***  Follow-up: *** weeks; *** via telehealth  Future considerations: Increase Trulicity to 4.5mg subcutaneous weekly vs initiating Jaridance if additional glycemic control is needed***    Tahir Correia, PharmD  [employer] PGY-1 Pharmacy Resident  [facility]         [1]   Allergies  Allergen Reactions    Lisinopril Swelling and Unknown    Tramadol Unknown     N&V

## 2025-06-11 ENCOUNTER — APPOINTMENT (OUTPATIENT)
Dept: PHARMACY | Facility: HOSPITAL | Age: 65
End: 2025-06-11
Payer: COMMERCIAL

## 2025-06-18 ENCOUNTER — OFFICE VISIT (OUTPATIENT)
Facility: HOSPITAL | Age: 65
End: 2025-06-18
Payer: COMMERCIAL

## 2025-06-18 VITALS
OXYGEN SATURATION: 96 % | WEIGHT: 227.8 LBS | HEIGHT: 67 IN | DIASTOLIC BLOOD PRESSURE: 83 MMHG | BODY MASS INDEX: 35.75 KG/M2 | HEART RATE: 70 BPM | TEMPERATURE: 96.4 F | SYSTOLIC BLOOD PRESSURE: 156 MMHG

## 2025-06-18 DIAGNOSIS — M54.40 LOW BACK PAIN WITH SCIATICA, SCIATICA LATERALITY UNSPECIFIED, UNSPECIFIED BACK PAIN LATERALITY, UNSPECIFIED CHRONICITY: ICD-10-CM

## 2025-06-18 DIAGNOSIS — E13.9 DIABETES 1.5, MANAGED AS TYPE 2 (MULTI): Primary | ICD-10-CM

## 2025-06-18 PROCEDURE — 99396 PREV VISIT EST AGE 40-64: CPT | Performed by: STUDENT IN AN ORGANIZED HEALTH CARE EDUCATION/TRAINING PROGRAM

## 2025-06-18 RX ORDER — CYCLOBENZAPRINE HCL 5 MG
TABLET ORAL
Qty: 30 TABLET | Refills: 0 | Status: SHIPPED | OUTPATIENT
Start: 2025-06-18

## 2025-06-18 RX ORDER — UBIQUINOL 100 MG
1 CAPSULE ORAL 3 TIMES DAILY
Qty: 100 EACH | Refills: 3 | Status: SHIPPED | OUTPATIENT
Start: 2025-06-18

## 2025-06-18 ASSESSMENT — ENCOUNTER SYMPTOMS
RHINORRHEA: 0
UNEXPECTED WEIGHT CHANGE: 0
DYSURIA: 0
EYE DISCHARGE: 0
AGITATION: 0
COUGH: 0
BACK PAIN: 0
ACTIVITY CHANGE: 0
HEADACHES: 0
FATIGUE: 0
CONSTIPATION: 0
SHORTNESS OF BREATH: 0
PHOTOPHOBIA: 0
FEVER: 0
DIARRHEA: 0
CONFUSION: 0
DIAPHORESIS: 0
NUMBNESS: 0
HEMATURIA: 0
ABDOMINAL PAIN: 0

## 2025-06-18 ASSESSMENT — PAIN SCALES - GENERAL: PAINLEVEL_OUTOF10: 0-NO PAIN

## 2025-06-18 NOTE — PROGRESS NOTES
"  Subjective   Patient ID: Apryl Chan is a 65 y.o. female who presents for Follow-up.     [facility]     Health maintenance   Cologuard mailed to her but has not done it yet  Has eye appt scheduled  Routine Labs UTD  A1C due 7/3/25    Review of Systems   Constitutional:  Negative for activity change, diaphoresis, fatigue, fever and unexpected weight change.   HENT:  Negative for congestion, rhinorrhea and sneezing.    Eyes:  Negative for photophobia and discharge.   Respiratory:  Negative for cough and shortness of breath.    Cardiovascular:  Negative for chest pain.   Gastrointestinal:  Negative for abdominal pain, constipation and diarrhea.   Endocrine: Negative for cold intolerance and heat intolerance.   Genitourinary:  Negative for dysuria and hematuria.   Musculoskeletal:  Negative for back pain and gait problem.        Intermittent muscle spasms in neck and sciatica pain    Neurological:  Negative for numbness and headaches.   Psychiatric/Behavioral:  Negative for agitation and confusion.      Objective   /83 (BP Location: Right arm, Patient Position: Sitting, BP Cuff Size: Adult)   Pulse 70   Temp 35.8 °C (96.4 °F) (Temporal)   Ht 1.702 m (5' 7\")   Wt 103 kg (227 lb 12.8 oz)   SpO2 96%   BMI 35.68 kg/m²     Physical Exam  Constitutional:       Appearance: She is obese.   HENT:      Head: Normocephalic.   Eyes:      Extraocular Movements: Extraocular movements intact.   Cardiovascular:      Rate and Rhythm: Normal rate and regular rhythm.      Pulses: Normal pulses.      Heart sounds: Normal heart sounds.   Pulmonary:      Effort: Pulmonary effort is normal.      Breath sounds: Normal breath sounds.   Abdominal:      General: Bowel sounds are normal.      Palpations: Abdomen is soft.   Skin:     General: Skin is warm and dry.      Capillary Refill: Capillary refill takes less than 2 seconds.   Neurological:      Mental Status: She is alert and oriented to person, place, and time. "       Assessment/Plan   Problem List Items Addressed This Visit           ICD-10-CM    Diabetes 1.5, managed as type 2 (Multi) - Primary E13.9    Relevant Medications    Alcohol Prep Pads    Other Relevant Orders    Hemoglobin A1c     Other Visit Diagnoses         Codes      Low back pain with sciatica, sciatica laterality unspecified, unspecified back pain laterality, unspecified chronicity     M54.40    Relevant Medications    cyclobenzaprine (Flexeril) 5 mg tablet               Note: This documentaion was entered into the electronic medical record using [employer] medical dictation software, and was not necessarily edited thereafter. Please excuse any errors of spelling or grammar.

## 2025-06-19 ENCOUNTER — TELEMEDICINE (OUTPATIENT)
Dept: PHARMACY | Facility: HOSPITAL | Age: 65
End: 2025-06-19
Payer: COMMERCIAL

## 2025-06-19 DIAGNOSIS — E11.9 TYPE 2 DIABETES MELLITUS WITHOUT COMPLICATION, WITHOUT LONG-TERM CURRENT USE OF INSULIN: ICD-10-CM

## 2025-06-19 DIAGNOSIS — E13.9 DIABETES 1.5, MANAGED AS TYPE 2 (MULTI): ICD-10-CM

## 2025-06-19 RX ORDER — METFORMIN HYDROCHLORIDE 500 MG/1
1000 TABLET ORAL
Qty: 120 TABLET | Refills: 11 | Status: SHIPPED | OUTPATIENT
Start: 2025-06-19 | End: 2026-07-24

## 2025-06-19 NOTE — ASSESSMENT & PLAN NOTE
Assessment:    Patient's DM is currently uncontrolled based on most recent hemoglobin A1C level of 9.6% (4/3/25). Patient currently on metformin 1000mg PO BID, glipizide XL 10mg PO daily, and Trulicity 3mg subcutaneous weekly (took 3rd dose yesterday) and reports compliance with meds and denies any ADRs to meds at these doses. The average of patient's home glucose readings after having taken 3 doses of Trulicity 3mg subcutaneous weekly is only moderately above goal, thus, it is reasonable to expect that the average of patient's home glucose readings after having taken 4 doses of Trulicity 3mg subcutaneous weekly may be at goal.     Plan:    - Continue metformin 1000mg PO BID    - Continue glipizide XL 10mg PO daily  - Continue Trulicity 3mg subcutaneous weekly  - Continue checking glucose level at home every day (morning fasting) and have readings available at next visit with clinical pharmacist  - Instructed patient to go to lab on either 6/26/25 or 6/27/25 to have hemoglobin A1C, CMP, fasting lipid panel, and vitamin B12 level checked

## 2025-06-19 NOTE — PROGRESS NOTES
Primary Care Clinical Pharmacist Follow-Up Visit    Date of Follow-Up Visit: 6/19/25  Date of Initial Visit: 4/17/25  Disease state(s) to be managed by clinical pharmacist: DMT1.5 (managed as DMT2)  Referring Provider: Dr. Kermit Khan (PCP)  Most recent appointment with PCP: 6/18/25  Next appointment with PCP: 9/2/25        Subjective:    Patient is a 64 YO F who presents virtually to visit with clinical pharmacist for follow-up visit for management of DMT1.5 (managed as DMT2). Patient gives consent to have visit conducted via telehealth. Patient was referred to clinical pharmacist for management of disease state(s) by PCP. At last visit, patient's Trulicity dose was increased from 1.5mg subcutaneous weekly to 3mg subcutaneous weekly, and patient was continued on metformin 1000mg PO BID and glipizide XL 10mg PO daily. At today's visit, patient reports taking metformin 1000mg PO BID, glipizide XL 10mg PO daily, and Trulicity 3mg subcutaneous weekly (took 3rd dose yesterday) and reports compliance with meds and denies any ADRs to meds at these doses.    Objective:    Most recent hemoglobin A1C level: 9.6% (4/3/25) (goal: less than 7%)      Below are patient's home glucose readings since last visit (all readings morning fasting):    129, 130, 130, 137, 130, 137, 130, 124, 136, 147, 138, 123, 147, 135    (Average: 134 mg/dL, goal:  mg/dL)      Most recent eGFR: 87 (4/3/25)  Most recent vitamin B12 level:  none on file for the past 12 months  Most recent Albumin/SCr ratio:  41 (4/3/25)- patient already receiving nephroprotection via losartan 100mg PO daily  Most recent AST/ALT: 24/32 (4/3/25)  Most recent diabetic retinopathy exam: unknown   Most recent influenza vaccine: 11/2024  Most recent pneumococcal vaccine: 2/22/24 (PCV20)    Below are the results of patient's most recent lipid panel  (non-fasting) (from 1/9/25):    LDL:   not calculated             TG :  not calculated   HDL:   37  (goal: greater than 50)    TC: 178 (goal: less than 200)      Assessment:    Patient's DM is currently uncontrolled based on most recent hemoglobin A1C level of 9.6% (4/3/25). Patient currently on metformin 1000mg PO BID, glipizide XL 10mg PO daily, and Trulicity 3mg subcutaneous weekly (took 3rd dose yesterday) and reports compliance with meds and denies any ADRs to meds at these doses. The average of patient's home glucose readings after having taken 3 doses of Trulicity 3mg subcutaneous weekly is only moderately above goal, thus, it is reasonable to expect that the average of patient's home glucose readings after having taken 4 doses of Trulicity 3mg subcutaneous weekly may be at goal.     Plan:    - Continue metformin 1000mg PO BID    - Continue glipizide XL 10mg PO daily  - Continue Trulicity 3mg subcutaneous weekly  - Continue checking glucose level at home every day (morning fasting) and have readings available at next visit with clinical pharmacist  - Instructed patient to go to lab on either 6/26/25 or 6/27/25 to have hemoglobin A1C, CMP, fasting lipid panel, and vitamin B12 level checked      The patient verbalized understanding of everything discussed at today's visit and agrees with plan formulated by clinical pharmacist    Next visit with clinical pharmacist:  6/30/25 at 11 AM (telehealth)      Continue all meds under the continuation of care with the referring provider and clinical pharmacy team.    Patient Education    The patient is aware of the following regarding DMT2    - The side effects of the medications patient uses to treat disease state(s) and what to do if they occur    - How to correct hypoglycemia, if it ever occurs      TRINO Murry, PharmD, BCACP  Clinical Pharmacy Specialist, [facility]

## 2025-06-29 LAB
ALBUMIN SERPL-MCNC: 4.4 G/DL (ref 3.6–5.1)
ALP SERPL-CCNC: 77 U/L (ref 37–153)
ALT SERPL-CCNC: 18 U/L (ref 6–29)
ANION GAP SERPL CALCULATED.4IONS-SCNC: 9 MMOL/L (CALC) (ref 7–17)
AST SERPL-CCNC: 15 U/L (ref 10–35)
BILIRUB SERPL-MCNC: 0.5 MG/DL (ref 0.2–1.2)
BUN SERPL-MCNC: 11 MG/DL (ref 7–25)
CALCIUM SERPL-MCNC: 9.6 MG/DL (ref 8.6–10.4)
CHLORIDE SERPL-SCNC: 104 MMOL/L (ref 98–110)
CHOLEST SERPL-MCNC: 181 MG/DL
CHOLEST/HDLC SERPL: 4.8 (CALC)
CO2 SERPL-SCNC: 27 MMOL/L (ref 20–32)
CREAT SERPL-MCNC: 0.61 MG/DL (ref 0.5–1.05)
EGFRCR SERPLBLD CKD-EPI 2021: 100 ML/MIN/1.73M2
EST. AVERAGE GLUCOSE BLD GHB EST-MCNC: 154 MG/DL
EST. AVERAGE GLUCOSE BLD GHB EST-SCNC: 8.5 MMOL/L
GLUCOSE SERPL-MCNC: 139 MG/DL (ref 65–99)
HBA1C MFR BLD: 7 %
HDLC SERPL-MCNC: 38 MG/DL
LDLC SERPL CALC-MCNC: 110 MG/DL (CALC)
NONHDLC SERPL-MCNC: 143 MG/DL (CALC)
POTASSIUM SERPL-SCNC: 4.5 MMOL/L (ref 3.5–5.3)
PROT SERPL-MCNC: 7.4 G/DL (ref 6.1–8.1)
SODIUM SERPL-SCNC: 140 MMOL/L (ref 135–146)
TRIGL SERPL-MCNC: 212 MG/DL
VIT B12 SERPL-MCNC: 368 PG/ML (ref 200–1100)

## 2025-06-30 ENCOUNTER — APPOINTMENT (OUTPATIENT)
Dept: PHARMACY | Facility: HOSPITAL | Age: 65
End: 2025-06-30
Payer: COMMERCIAL

## 2025-06-30 DIAGNOSIS — E13.9 DIABETES 1.5, MANAGED AS TYPE 2 (MULTI): ICD-10-CM

## 2025-06-30 RX ORDER — DULAGLUTIDE 4.5 MG/.5ML
4.5 INJECTION, SOLUTION SUBCUTANEOUS WEEKLY
Qty: 2 ML | Refills: 11 | Status: SHIPPED | OUTPATIENT
Start: 2025-06-30

## 2025-06-30 NOTE — PROGRESS NOTES
Primary Care Clinical Pharmacist Follow-Up Visit    Date of Follow-Up Visit: 6/30/25  Date of Initial Visit: 4/17/25  Disease state(s) to be managed by clinical pharmacist: DMT1.5 (managed as DMT2)  Referring Provider: Dr. Kermit Khan (PCP)  Most recent appointment with PCP: 6/18/25  Next appointment with PCP: 9/2/25        Subjective:    Patient is a 64 YO F who presents virtually to visit with clinical pharmacist for follow-up visit for management of DMT1.5 (managed as DMT2). Patient gives consent to have visit conducted via telehealth. Patient was referred to clinical pharmacist for management of disease state(s) by PCP. At last visit, patient was continued on metformin 1000mg PO BID, glipizide XL 10mg PO daily, and Trulicity 3mg subcutaneous weekly. At today's visit, patient reports taking metformin 1000mg PO BID, glipizide XL 10mg PO daily, and Trulicity 3mg subcutaneous weekly (took 4th dose on 6/26/25) and reports overall good compliance with meds and denies any ADRs to meds at these doses.    Objective:    Most recent hemoglobin A1C level: 7% (6/28/25) (goal: less than 7%)      Below are patient's home glucose readings from the past two weeks (all readings morning fasting):    129, 123, 135, 162, 160, 137, 127, 124    (Average: 137 mg/dL, goal:  mg/dL)      Most recent eGFR: 100 (6/28/25)  Most recent vitamin B12 level:  368 (6/28/25)  Most recent Albumin/SCr ratio:  41 (4/3/25)- patient already receiving nephroprotection via losartan 100mg PO daily  Most recent AST/ALT: 15/18 (6/28/25)  Most recent diabetic retinopathy exam: unknown   Most recent pneumococcal vaccine: 2/22/24 (PCV20)    Below are the results of patient's most recent fasting lipid panel (from 6/28/25):    LDL: 110 (goal: less than 70)             TG :  212 (goal: less than 500)  HDL:   38  (goal: greater than 50)    TC:  181 (goal: less than 200)    *The results from the above fasting lipid panel are reflective of current therapy with pravastatin 40mg PO at bedtime, however patient has not been compliant with med    Assessment:    Patient's DM is almost controlled based on most recent hemoglobin A1C level of 7% (6/28/25). The average of patient's home glucose readings for the past two weeks is moderately above goal. Patient currently on metformin 1000mg PO BID, glipizide XL 10mg PO daily, and Trulicity 3mg subcutaneous weekly (took 4th dose on 6/26/25) and reports overall good compliance with meds and denies any ADRs to meds at these doses.     Plan:  - Increase Trulicity dose from 3mg subcutaneous weekly to 4.5mg subcutaneous weekly (prescription for Trulicity 4.5mg subcutaneous weekly electronically sent to patient's preferred pharmacy)              - Instructed patient to decrease dose back down to 3mg subcutaneous weekly if unable to tolerate 4.5mg subcutaneous weekly dose  - Continue metformin 1000mg PO BID    - Continue glipizide XL 10mg PO daily  - Continue checking glucose level at home every day (morning fasting) and have readings available at next visit with clinical pharmacist        The patient verbalized understanding of everything discussed at today's visit and agrees with plan formulated by clinical pharmacist    Next visit with clinical pharmacist:  7/28/25 at 11 AM (telehealth)      Continue all meds under the continuation of care with the referring provider and clinical pharmacy team.    Patient Education    The patient is aware of the following regarding DMT2    - The side effects of the medications patient uses to treat disease state(s) and what to do if they occur    - How to correct hypoglycemia, if it ever occurs      TRINO Murry, PharmD, BCACP  Clinical Pharmacy Specialist, [facility]

## 2025-07-01 DIAGNOSIS — E78.2 MIXED HYPERLIPIDEMIA: ICD-10-CM

## 2025-07-01 NOTE — ASSESSMENT & PLAN NOTE
Assessment:    Patient's DM is almost controlled based on most recent hemoglobin A1C level of 7% (6/28/25). The average of patient's home glucose readings for the past two weeks is moderately above goal. Patient currently on metformin 1000mg PO BID, glipizide XL 10mg PO daily, and Trulicity 3mg subcutaneous weekly (took 4th dose on 6/26/25) and reports overall good compliance with meds and denies any ADRs to meds at these doses.     Plan:  - Increase Trulicity dose from 3mg subcutaneous weekly to 4.5mg subcutaneous weekly (prescription for Trulicity 4.5mg subcutaneous weekly electronically sent to patient's preferred pharmacy)              - Instructed patient to decrease dose back down to 3mg subcutaneous weekly if unable to tolerate 4.5mg subcutaneous weekly dose  - Continue metformin 1000mg PO BID    - Continue glipizide XL 10mg PO daily  - Continue checking glucose level at home every day (morning fasting) and have readings available at next visit with clinical pharmacist

## 2025-07-02 RX ORDER — PRAVASTATIN SODIUM 40 MG/1
40 TABLET ORAL NIGHTLY
Qty: 90 TABLET | Refills: 3 | Status: SHIPPED | OUTPATIENT
Start: 2025-07-02

## 2025-07-03 DIAGNOSIS — E13.9 DIABETES 1.5, MANAGED AS TYPE 2 (MULTI): ICD-10-CM

## 2025-07-11 ENCOUNTER — OFFICE VISIT (OUTPATIENT)
Dept: NEUROLOGY | Facility: HOSPITAL | Age: 65
End: 2025-07-11
Payer: COMMERCIAL

## 2025-07-11 DIAGNOSIS — G35 MULTIPLE SCLEROSIS (MULTI): Primary | ICD-10-CM

## 2025-07-11 PROCEDURE — 99214 OFFICE O/P EST MOD 30 MIN: CPT | Mod: GC | Performed by: PSYCHIATRY & NEUROLOGY

## 2025-07-11 NOTE — PROGRESS NOTES
[facility] NEUROIMMUNOLOGY EVALUATION    CC: Multiple sclerosis follow-up    PRINCIPAL NEUROLOGIC DIAGNOSIS: MS    DISEASE SUMMARY:  Onset: 2011  Diagnosis of MS: 2011  Disease course at onset: R  Current disease course: R, stable, inactive  Previous disease therapies: Copaxone  Current disease therapies: Glatopa 3 times a week  Most recent MRI brain:   -11/24 stable compared to prior 2022, typical of MS  -April 2019 MRI showed a single new nonenhancing 10 mm right frontal centrum semiovale lesion compared to the prior study from 2017 (not compliant with DMT at that time)  Most recent MRI cervical spine: 2022 prior 2015  Most recent MRI thoracic spine: 2015  Stable C4/5 level, T2/3 and T6 level cord lesions  CSF: remote, reportedly compatible with MS    History summary:   64-year-old woman with a diagnosis of multiple sclerosis on Glatopa.  She also has a history of hypertension, diabetes, monoclonal gammopathy.  She had been following with Dr. Wary since 2010.  For her lateral thigh sensory changes, Dr. Wray had suspected a lateral femoral cutaneous neuropathy at some point. From Dr. Wray's notes, it appears her MS has been clinically stable. Patient reports at onset of her MS she had slurred speech and greying of the right eye, not sure when, later around 2011 had an episode of ascending numbness, at which time she was diagnosed with MS. Recovered from both episodes, for the second one received steroids. No obvious relapses since.  Last sign of activity being a new lesion in 2019 when she was not compliant with Glatopa. She cannot do the Glatopa injections in the arms because they are painful, so sticks with lower extremity sites. Gets lumps and soreness from injections but otherwise tolerates Glatopa well.     Last seen by Neurology on 1/2025, at which time her MS was stable and she chose to continue with Glatopa.    Current symptoms:  States she has been dealing with various life stressors.  States her mood is up and down. She denies loss of interest in activity, no SI. She states her MS symptoms are stable. She denies any episodes of numbness, weakness, vision changes. She endorses stable chronic fatigue. Endorses urinary urgency and waking up 3 times overnight to use the restroom, previously tried oxybutynin but had to stop it for memory issues. States she has rare spasms, once every few months, that resolve with flexeril. Takes vitamin D. Smokes 3 cigarettes a day, interested in trying nicotine patches. She goes for a 30-45 minute walk everyday. A1c improved from 9.6 > 7.0%.  States she is tolerating glatopa well, though she does get soreness following her injections.    ROS  10-point review of systems was negative except as mentioned in the HPI and/or the [facility] form.     PMH  Patient Active Problem List   Diagnosis    Obesity, morbid (Multi)    Need for vaccination    COPD (chronic obstructive pulmonary disease) (Multi)    Diabetes 1.5, managed as type 2 (Multi)        [facility]  No family history on file.       Social History     Tobacco Use    Smoking status: Every Day     Types: Cigarettes    Smokeless tobacco: Current   Substance Use Topics    Alcohol use: Not Currently    Drug use: Never        ALL  Allergies   Allergen Reactions    Lisinopril Swelling and Unknown    Tramadol Unknown     N&V        MEDS    Current Outpatient Medications:     albuterol 90 mcg/actuation inhaler, INHALE 1 TO 2 PUFFS BY MOUTH EVERY 4 TO 6 HOURS AS NEEDED AS DIRECTED, Disp: 18 g, Rfl: 11    Alcohol Prep Pads, Apply 1 each topically 3 times a day., Disp: 100 each, Rfl: 3    amLODIPine (Norvasc) 10 mg tablet, Take 1 tablet (10 mg) by mouth once daily., Disp: 30 tablet, Rfl: 11    ammonium lactate (Amlactin) 12 % cream, APPLY 1 APPLICATION TOPICALLY EVERY DAY, Disp: , Rfl:     aspirin 81 mg EC tablet, Take by mouth. (Patient taking differently: Take 1 tablet (81 mg) by mouth every other day.), Disp: , Rfl:     blood sugar  diagnostic (Blood Glucose Test) strip, Inject 1 strip under the skin once daily., Disp: 30 strip, Rfl: 11    cholecalciferol (Vitamin D-3) 50 mcg (2,000 unit) capsule, Take 1 capsule (50 mcg) by mouth early in the morning.., Disp: 90 capsule, Rfl: 3    cyclobenzaprine (Flexeril) 5 mg tablet, TAKE 1-2 TABLETS DAILY AS NEEDED FOR MUSCLE SPASMS OF NECK OR BACK, Disp: 30 tablet, Rfl: 0    diazePAM (Valium) 5 mg tablet, Take by mouth. (Patient not taking: Reported on 6/30/2025), Disp: , Rfl:     diazePAM (Valium) 5 mg tablet, Take 2 tablets (10 mg) by mouth 1 time if needed for anxiety for up to 1 dose. Take 2 tablets 30 minutes prior to MRI for claustrophobia., Disp: 2 tablet, Rfl: 0    diclofenac sodium (Voltaren) 1 % gel, APPLY SPARINGLY TO AFFECTED AREA EVERY DAY, Disp: 450 g, Rfl: 3    dulaglutide (Trulicity) 3 mg/0.5 mL injection, Inject 3 mg under the skin 1 (one) time per week., Disp: 2 mL, Rfl: 11    dulaglutide (Trulicity) 4.5 mg/0.5 mL pen injector, Inject 4.5 mg under the skin 1 (one) time per week., Disp: 2 mL, Rfl: 11    FreeStyle Lancets 28 gauge, TEST BLOOD GLUCOSE EVERY MORNING BEFORE EATING, Disp: 100 each, Rfl: 11    FreeStyle Lite Strips, TEST ONCE DAILY, Disp: 90 strip, Rfl: 3    Glatopa 20 mg/mL syringe, Inject 1 mL (20 mg) under the skin once daily., Disp: 30 mL, Rfl: 5    glipiZIDE XL (Glucotrol XL) 10 mg 24 hr tablet, Take 1 tablet (10 mg) by mouth once daily. as directed, Disp: 30 tablet, Rfl: 11    ibuprofen 800 mg tablet, Take 1 tablet (800 mg) by mouth every 8 hours if needed for moderate pain (4 - 6)., Disp: 30 tablet, Rfl: 11    ketoconazole (NIZOral) 2 % cream, APPLY TOPICALLY EVERY DAY, Disp: , Rfl:     losartan (Cozaar) 100 mg tablet, Take 1 tablet (100 mg) by mouth once daily., Disp: 90 tablet, Rfl: 11    metFORMIN (Glucophage) 500 mg tablet, TAKE 2 TABLETS (1,000 MG) BY MOUTH 2 TIMES DAILY (MORNING AND LATE AFTERNOON)., Disp: 120 tablet, Rfl: 11    multivit-minerals/folic acid  (MULTIVITAMIN GUMMIES ORAL), Take 2 tablets by mouth once daily., Disp: , Rfl:     mv-min-folic acid-lutein (Essential Woman 50 Plus) 0.4-250 mg-mcg tablet, Take 1 tablet by mouth once daily. (Patient not taking: Reported on 6/30/2025), Disp: , Rfl:     nicotine (Nicoderm CQ) 7 mg/24 hr patch, Place 1 patch over 24 hours on the skin once every 24 hours., Disp: 14 patch, Rfl: 2    pravastatin (Pravachol) 40 mg tablet, TAKE 1 TABLET (40 MG) BY MOUTH ONCE DAILY AT BEDTIME., Disp: 90 tablet, Rfl: 3     PHYSICAL EXAM  VITALS  There were no vitals taken for this visit.    Normal general appearance. The patient was alert and oriented to person, place, and time with normal language, attention and concentration, recent and remote memory. Normal fund of knowledge. Affect was normal.  The patient did not appear depressed.    Visual fields were full to finger count.  Pupils were 6 mm and briskly reactive OU without a relative afferent pupillary defect.  Eye movements: normal fixation, no spontaneous or gaze-evoked nystagmus, full range of motion with normal horizontal smooth pursuit.  Facial sensation to light touch and pinprick was normal.  Muscles of mastication and facial expression moved normally.  Palate elevated symmetrically. Sternocleidomastoid and trapezius power were normal.  Tongue movements were normal.  There was no dysarthria.    Normal bulk and tone all 4 extremities.    Muscle Strength (#/5):  Upper extremity    Deltoids Right 5 Left 5  Biceps Right 5 Left 5  Triceps Right 5 Left 5   Right 5 Left 5  Lower extremity    Iliopsoas Right 5 Left 5  Quadriceps Right 5 Left 5  Hamstrings Right 5 Left 5  Tibialis ant Right 5 Left 5  Gastrocn Right 5 Left 5    Reflexes:  Upper extremity    Biceps Right 2+ Left 2+  Triceps Right 2+ Left 2+  Brachiorad Right 2+ Left 2+  Lower extremity    Patellar Right 2+ Left 2+    Coordination in the arms was intact on finger-to-nose without dysmetria or overshoot. No involuntary  movements observed.    Sensation intact to light touch all 4 extremities when comparing bilaterally and UE to LE.    Standard gait was normal.       Assessment/Plan    Ms. Chan is a 64-year-old woman with relapsing multiple sclerosis on glatopa presenting for follow-up. Last MRIb 11/2024 stable. Last seen 1/2025, MS has been stable since last visit without clinical signs of relapse. She is due for repeat MRIb 11/2025. She is tolerating glatopa well. She endorses urinary urgency with average 3 awakenings per night to use the restroom. Will send referral to urology for further evaluation. Patient was also encouraged to quit smoking and contact her PCP for prescription of smoking cessation aids.    Plan:  - MRIb wwo due 11/2025  - Urology referral for urinary urgency  - Continue Glatopa  - Follow-up in 6 months with Christina Seymour MD  Neurology, PGY-2    Patient seen and discussed with attending physician Dr. Ayala.    There are no diagnoses linked to this encounter.

## 2025-07-11 NOTE — PATIENT INSTRUCTIONS
Dear Ms. Chan,    You were seen in Neurology clinic by Dr. Ayala for multiple sclerosis.     You are due for an MRI brain in November. We ordered this for you today so you can get it scheduled.    We also placed a Urology referral for evaluation of your urinary urgency.    You will follow up with our nurse practitioner Christina Spencer in 6 months.

## 2025-07-18 ENCOUNTER — APPOINTMENT (OUTPATIENT)
Dept: NEUROLOGY | Facility: HOSPITAL | Age: 65
End: 2025-07-18
Payer: COMMERCIAL

## 2025-07-28 ENCOUNTER — APPOINTMENT (OUTPATIENT)
Dept: PHARMACY | Facility: HOSPITAL | Age: 65
End: 2025-07-28
Payer: COMMERCIAL

## 2025-08-05 ENCOUNTER — APPOINTMENT (OUTPATIENT)
Dept: PHARMACY | Facility: HOSPITAL | Age: 65
End: 2025-08-05
Payer: COMMERCIAL

## 2025-08-05 DIAGNOSIS — E11.9 TYPE 2 DIABETES MELLITUS WITHOUT COMPLICATION, WITHOUT LONG-TERM CURRENT USE OF INSULIN: ICD-10-CM

## 2025-08-05 DIAGNOSIS — E13.9 DIABETES 1.5, MANAGED AS TYPE 2 (MULTI): Primary | ICD-10-CM

## 2025-08-05 NOTE — PROGRESS NOTES
Primary Care Clinical Pharmacist Follow-Up Visit    Date of Follow-Up Visit: 8/5/25  Date of Initial Visit: 4/17/25  Disease state(s) to be managed by clinical pharmacist: DMT1.5 (managed as DMT2)  Referring Provider: Dr. Kermit Khan (PCP)  Most recent appointment with PCP: 6/18/25  Next appointment with PCP: 9/2/25        Subjective:    Patient is a 66 YO F who presents virtually to visit with clinical pharmacist for follow-up visit for management of DMT1.5 (managed as DMT2). Patient gives consent to have visit conducted via telehealth. Patient was referred to clinical pharmacist for management of disease state(s) by PCP. At last visit, patient's Trulicity dose was increased from from 3mg subcutaneous weekly to 4.5mg subcutaneous weekly, and patient was continued on metformin 1000mg PO BID and glipizide XL 10mg PO daily.  At today's visit, patient reports taking metformin 1000mg PO BID, glipizide XL 10mg PO daily, and Trulicity 4.5mg subcutaneous weekly (has already taken 4 doses of med) and reports overall good compliance with meds and denies any ADRs to meds at these doses.      Objective:    Most recent hemoglobin A1C level: 7% (6/28/25) (goal: less than 7%)      Below are patient's home glucose readings from the past two weeks (all readings morning fasting):    135, 127, 130, 121, 132, 120, 142, 120, 132, 120, 123, 121    (Average: 127 mg/dL, goal:  mg/dL)    Patient did not obtain any readings less than 70 mg/dL since last visit    Most recent eGFR: 100 (6/28/25)  Most recent vitamin B12 level:  368 (6/28/25)  Most recent Albumin/SCr ratio:  41 (4/3/25)- patient already receiving nephroprotection via losartan 100mg PO daily  Most recent AST/ALT: 15/18 (6/28/25)  Most recent diabetic retinopathy exam: unknown  Most recent pneumococcal vaccine: 2/22/24 (PCV20)    Below are the results of  patient's most recent fasting lipid panel (from 6/28/25):    LDL: 110 (goal: less than 70)             TG :  212 (goal: less than 500)  HDL:   38  (goal: greater than 50)    TC: 181 (goal: less than 200)    *The results from the above fasting lipid panel are reflective of current therapy with pravastatin 40mg PO at bedtime, however patient has not been compliant with med    Assessment:    Patient's DM is essentially controlled based on most recent hemoglobin A1C level of 7% (6/28/25) and the average of patient's home glucose readings being at goal. Patient currently on metformin 1000mg PO BID, glipizide XL 10mg PO daily, and Trulicity 4.5mg subcutaneous weekly (already took four doses of med) and reports overall good compliance with meds and denies any ADRs to meds at these doses.     Plan:  - Continue metformin 1000mg PO BID    - Continue glipizide XL 10mg PO daily  - Continue Trulicity 4.5mg subcutaneous weekly  - Continue checking glucose level at home every day (morning fasting) and have readings available at next visit with clinical pharmacist    Rxs: 90-day supply to [facility] (fill after 8/21/25)  Trulicity 4.5mg subcutaneous weekly   Metformin 1000mg tablet ( 1 BID)  Glipizide XL 10mg tablet (1 daily)      The patient verbalized understanding of everything discussed at today's visit and agrees with plan formulated by clinical pharmacist    Next visit with clinical pharmacist:  9/3/25 at 11 AM (telehealth)      Continue all meds under the continuation of care with the referring provider and clinical pharmacy team.    Patient Education    The patient is aware of the following regarding DMT2    - The side effects of the medications patient uses to treat disease state(s) and what to do if they occur    - How to correct hypoglycemia, if it ever occurs      TRINO Murry, PharmD, BCACP  Clinical Pharmacy Specialist, [facility]

## 2025-08-06 PROCEDURE — RXMED WILLOW AMBULATORY MEDICATION CHARGE

## 2025-08-06 RX ORDER — GLIPIZIDE 10 MG/1
10 TABLET, FILM COATED, EXTENDED RELEASE ORAL DAILY
Qty: 90 TABLET | Refills: 3 | Status: SHIPPED | OUTPATIENT
Start: 2025-08-06

## 2025-08-06 RX ORDER — DULAGLUTIDE 4.5 MG/.5ML
4.5 INJECTION, SOLUTION SUBCUTANEOUS WEEKLY
Qty: 6 ML | Refills: 3 | Status: SHIPPED | OUTPATIENT
Start: 2025-08-06

## 2025-08-06 RX ORDER — METFORMIN HYDROCHLORIDE 1000 MG/1
1000 TABLET ORAL
Qty: 180 TABLET | Refills: 3 | Status: SHIPPED | OUTPATIENT
Start: 2025-08-06 | End: 2026-08-01

## 2025-08-06 NOTE — ASSESSMENT & PLAN NOTE
Assessment:    Patient's DM is essentially controlled based on most recent hemoglobin A1C level of 7% (6/28/25) and the average of patient's home glucose readings being at goal. Patient currently on metformin 1000mg PO BID, glipizide XL 10mg PO daily, and Trulicity 4.5mg subcutaneous weekly (already took four doses of med) and reports overall good compliance with meds and denies any ADRs to meds at these doses.     Plan:  - Continue metformin 1000mg PO BID    - Continue glipizide XL 10mg PO daily  - Continue Trulicity 4.5mg subcutaneous weekly  - Continue checking glucose level at home every day (morning fasting) and have readings available at next visit with clinical pharmacist

## 2025-08-07 ENCOUNTER — APPOINTMENT (OUTPATIENT)
Dept: OPHTHALMOLOGY | Facility: CLINIC | Age: 65
End: 2025-08-07
Payer: COMMERCIAL

## 2025-08-08 ENCOUNTER — PHARMACY VISIT (OUTPATIENT)
Dept: PHARMACY | Facility: CLINIC | Age: 65
End: 2025-08-08
Payer: COMMERCIAL

## 2025-08-17 PROCEDURE — RXMED WILLOW AMBULATORY MEDICATION CHARGE

## 2025-08-18 ENCOUNTER — PHARMACY VISIT (OUTPATIENT)
Dept: PHARMACY | Facility: CLINIC | Age: 65
End: 2025-08-18
Payer: COMMERCIAL

## 2025-08-20 DIAGNOSIS — M54.40 LOW BACK PAIN WITH SCIATICA, SCIATICA LATERALITY UNSPECIFIED, UNSPECIFIED BACK PAIN LATERALITY, UNSPECIFIED CHRONICITY: ICD-10-CM

## 2025-08-22 RX ORDER — CYCLOBENZAPRINE HCL 5 MG
TABLET ORAL
Qty: 30 TABLET | Refills: 0 | Status: SHIPPED | OUTPATIENT
Start: 2025-08-22

## 2025-09-03 ENCOUNTER — APPOINTMENT (OUTPATIENT)
Dept: PHARMACY | Facility: HOSPITAL | Age: 65
End: 2025-09-03
Payer: COMMERCIAL

## 2025-10-03 ENCOUNTER — APPOINTMENT (OUTPATIENT)
Dept: ENDOCRINOLOGY | Facility: CLINIC | Age: 65
End: 2025-10-03
Payer: COMMERCIAL

## 2026-01-08 ENCOUNTER — APPOINTMENT (OUTPATIENT)
Dept: ENDOCRINOLOGY | Facility: CLINIC | Age: 66
End: 2026-01-08
Payer: COMMERCIAL